# Patient Record
Sex: MALE
[De-identification: names, ages, dates, MRNs, and addresses within clinical notes are randomized per-mention and may not be internally consistent; named-entity substitution may affect disease eponyms.]

---

## 2018-03-27 ENCOUNTER — HOSPITAL ENCOUNTER (EMERGENCY)
Dept: HOSPITAL 97 - ER | Age: 38
Discharge: HOME | End: 2018-03-27

## 2018-03-27 PROCEDURE — 99283 EMERGENCY DEPT VISIT LOW MDM: CPT

## 2018-03-27 PROCEDURE — 93005 ELECTROCARDIOGRAM TRACING: CPT

## 2018-03-27 NOTE — EKG
Test Date:    2018-03-27               Test Time:    15:59:11

Technician:   ALIZE                                     

                                                     

MEASUREMENT RESULTS:                                       

Intervals:                                           

Rate:         86                                     

OR:           144                                    

QRSD:         80                                     

QT:           346                                    

QTc:          414                                    

Axis:                                                

P:            62                                     

OR:           144                                    

QRS:          61                                     

T:            39                                     

                                                     

INTERPRETIVE STATEMENTS:                                       

                                                     

Normal sinus rhythm

Normal ECG

No previous ECG available for comparison



Electronically Signed On 03-27-18 16:24:09 CDT by Wil Abraham

## 2018-03-27 NOTE — EDPHYS
Physician Documentation                                                                           

 [facility]                                                                

Name: Maulik Jones                                                                              

Age: 37 yrs                                                                                       

Sex: Male                                                                                         

: 1980                                                                                   

MRN: [id number]                                                                                   

Arrival Date: 2018                                                                          

Time: 14:28                                                                                       

Account#: [id number]                                                                            

Bed 18                                                                                            

Private MD:                                                                                       

ED Physician Jesús Ausitn                                                                         

HPI:                                                                                              

                                                                                             

15:29 This 37 yrs old  Male presents to ER via Ambulatory with complaints of Heat     cp  

      Exposure, Anxiety.                                                                          

15:31 The patient presents to the emergency department with anxiety. Onset: The               cp  

      symptoms/episode began/occurred today, while at work. Past psychiatric history: Prior       

      diagnosis: bipolar disorder, depression, Anxiety, PTSD, Psychiatric medications             

      include: unknown. Associated signs and symptoms: Pertinent positives; nausea,               

      dizziness, Pertinent negatives: abdominal pain, chest pain. Severity of symptoms: in        

      the emergency department the symptoms have improved moderately.                             

15:35 Patient reports he had to leave work early today after he started having nausea,        cp  

      general paresthesias, body cramping, dizziness.                                             

                                                                                                  

Historical:                                                                                       

- Allergies:                                                                                      

14:41 No Known Drug Allergies;                                                                lk1 

- PMHx:                                                                                           

14:41 Anxiety; Bipolar disorder; Depression; PTSD;                                            lk1 

- PSHx:                                                                                           

14:41 None;                                                                                   lk1 

                                                                                                  

- Immunization history:: Adult Immunizations up to date.                                          

- Social history:: Smoking status: Patient uses tobacco products, smokes one-half pack            

  cigarettes per day.                                                                             

                                                                                                  

                                                                                                  

ROS:                                                                                              

15:35 Eyes: Negative for injury, pain, redness, and discharge.                                cp  

15:35 Constitutional: Negative for body aches, chills, fever, poor PO intake.                     

15:35 ENT: Negative for drainage from ear(s), ear pain, sore throat, difficulty swallowing,       

      difficulty handling secretions.                                                             

15:35 Cardiovascular: Negative for chest pain, edema, palpitations.                               

15:35 Respiratory: Negative for cough, shortness of breath, wheezing.                             

15:35 Abdomen/GI: Positive for nausea, Negative for abdominal pain, vomiting, diarrhea,           

      constipation, black/tarry stool, rectal bleeding.                                           

15:35 Skin: Negative for cellulitis, rash.                                                        

15:35 Neuro: Positive for dizziness, lightheaded, Negative for altered mental status,             

      headache, syncope, near syncope, weakness.                                                  

15:35 Psych: Positive for anxiety, Negative for auditory hallucinations, visual                   

      hallucinations, suicide gesture, suicidal ideation.                                         

15:35 All other systems are negative.                                                             

                                                                                                  

Exam:                                                                                             

15:42 Constitutional: The patient appears in no acute distress, alert, awake,                 cp  

      non-diaphoretic, non-toxic, well developed, well nourished.                                 

15:42 Head/Face:  Normocephalic, atraumatic. Eyes:  Pupils equal round and reactive to light, cp  

      extra-ocular motions intact.  Lids and lashes normal.  Conjunctiva and sclera are           

      non-icteric and not injected.  Cornea within normal limits.  Periorbital areas with no      

      swelling, redness, or edema. ENT:  Nares patent. No nasal discharge, no septal              

      abnormalities noted.  Tympanic membranes are normal and external auditory canals are        

      clear.  Oropharynx with no redness, swelling, or masses, exudates, or evidence of           

      obstruction, uvula midline.  Mucous membranes moist. Chest/axilla:  Normal chest wall       

      appearance and motion.  Nontender with no deformity.  No lesions are appreciated.           

15:42 Cardiovascular: Rate: tachycardic, Rhythm: regular, Pulses: Pulses are 2+ in right          

      radial artery and left radial artery. Edema: is not appreciated, JVD: is not                

      appreciated.                                                                                

15:42 Respiratory: the patient does not display signs of respiratory distress,  Respirations:     

      normal, no use of accessory muscles, no retractions, no splinting, no tachypnea,            

      labored breathing, is not present, Breath sounds: are clear throughout, no decreased        

      breath sounds, no stridor, no wheezing.                                                     

15:42 Abdomen/GI: Inspection: abdomen appears normal, Bowel sounds: active, all quadrants,        

      Palpation: abdomen is soft and non-tender, in all quadrants, rebound tenderness, is not     

      appreciated, voluntary guarding, is not appreciated, involuntary guarding, is not           

      appreciated.                                                                                

15:42 Back: pain, is absent, ROM is normal.                                                       

15:42 Skin: cellulitis, is not appreciated, no rash present.                                      

15:42 Neuro: Orientation: to person, place \T\ time. Mentation: is normal, Cerebellar function:   

      is grossly normal, Motor: moves all fours, strength is normal, Sensation: no obvious        

      gross deficits.                                                                             

16:05 ECG was reviewed by the Attending Physician.                                            cp  

                                                                                                  

Vital Signs:                                                                                      

14:42  / 76; Pulse 101; Resp 20; Temp 97.0(TE); Pulse Ox 97% on R/A; Weight 86.18 kg    lk1 

      (R); Height 5 ft. 8 in. (172.72 cm); Pain 10/10;                                            

16:25  / 71; Pulse 89; Resp 18; Temp 97; Pulse Ox 99% ; Pain 0/10;                      rs2 

14:42 Body Mass Index 28.89 (86.18 kg, 172.72 cm)                                             lk1 

                                                                                                  

MDM:                                                                                              

14:47 Patient medically screened.                                                             cp  

15:30 Differential diagnosis: drug withdrawal. acute psychotic break, psychosis secondary to  cp  

      non-compliance, cardiac arrythmia.                                                          

16:05 Data reviewed: vital signs, nurses notes, EKG, and as a result, I will discharge        cp  

      patient.                                                                                    

16:05 Test interpretation: by ED physician or midlevel provider: ECG.                         cp  

16:05 Counseling: I had a detailed discussion with the patient and/or guardian regarding: the cp  

      historical points, exam findings, and any diagnostic results supporting the                 

      discharge/admit diagnosis, to return to the emergency department if symptoms worsen or      

      persist or if there are any questions or concerns that arise at home.                       

                                                                                                  

                                                                                             

15:38 Order name: EKG; Complete Time: 15:39                                                     

                                                                                             

15:38 Order name: EKG - Nurse/Tech; Complete Time: 16:11                                      cp  

                                                                                                  

EC:05 Rate is 86 beats/min. Rhythm is regular. CO interval is normal. QRS interval is normal. cp  

      QT interval is normal. No ST changes noted. Interpreted by me. Reviewed by me.              

                                                                                                  

Administered Medications:                                                                         

15:31 Drug: Zofran 4 mg Route: PO;                                                            rs2 

16:11 Follow up: Response: No adverse reaction; Marked relief of symptoms                     rs2 

16:24 Follow up: Response: No adverse reaction; Marked relief of symptoms                     rs2 

                                                                                                  

                                                                                                  

Disposition:                                                                                      

16:32 Co-signature as Attending Physician, Jesús Austin MD.                                    rn  

                                                                                                  

Disposition:                                                                                      

18 16:08 Discharged to Home. Impression: Nausea.                                            

- Condition is Stable.                                                                            

- Discharge Instructions: Panic Attacks, Nausea, Adult.                                           

- Prescriptions for Zofran 4 mg Oral Tablet - take 1 tablet by ORAL route every 12                

  hours As needed; 20 tablet.                                                                     

- Medication Reconciliation Form, Thank You Letter, Antibiotic Education, Prescription            

  Opioid Use, Work release form form.                                                             

- Follow up: Private Physician; When: 1 - 2 days; Reason: Recheck today's complaints.             

- Problem is new.                                                                                 

- Symptoms have improved.                                                                         

                                                                                                  

                                                                                                  

                                                                                                  

Signatures:                                                                                       

Jesús Austin MD MD   rn                                                   

Nahum Luna PA PA cp Kluge Judy, RN                         RN   lk1                                                  

Chuck, Any                                 rs2                                                  

                                                                                                  

**************************************************************************************************

## 2018-03-27 NOTE — ER
Nurse's Notes                                                                                     

 [facility]                                                                

Name: Maulik Jones                                                                              

Age: 37 yrs                                                                                       

Sex: Male                                                                                         

: 1980                                                                                   

MRN: [id number]                                                                                   

Arrival Date: 2018                                                                          

Time: 14:28                                                                                       

Account#: [id number]                                                                            

Bed 18                                                                                            

Private MD:                                                                                       

Diagnosis: Nausea                                                                                 

                                                                                                  

Presentation:                                                                                     

                                                                                             

14:38 Presenting complaint: Patient states: "I overheated and got dehydrated. I have bipolar  lk1 

      disorder and PTSD and anxiety. I felt like I was going to die and my whole body got         

      numb and I was scared. My hand was cramped and I got light headed. I feel better, but       

      my hands are still tingling." Denies suicidal ideations at this time. Transition of         

      care: patient was not received from another setting of care. Onset of symptoms was          

      2018 at 13:30. Care prior to arrival: None.                                       

14:38 Method Of Arrival: Ambulatory                                                           lk1 

14:38 Acuity: VINCENT 3                                                                           lk1 

                                                                                                  

Triage Assessment:                                                                                

14:42 General: Appears in no apparent distress. Behavior is calm, cooperative, appropriate    lk1 

      for age. Pain: Complains of pain in right hand and left hand Pain currently is 10 out       

      of 10 on a pain scale. Derm: Skin is pink, warm \T\ dry.                                    

                                                                                                  

Historical:                                                                                       

- Allergies:                                                                                      

14:41 No Known Drug Allergies;                                                                lk1 

- PMHx:                                                                                           

14:41 Anxiety; Bipolar disorder; Depression; PTSD;                                            lk1 

- PSHx:                                                                                           

14:41 None;                                                                                   lk1 

                                                                                                  

- Immunization history:: Adult Immunizations up to date.                                          

- Social history:: Smoking status: Patient uses tobacco products, smokes one-half pack            

  cigarettes per day.                                                                             

                                                                                                  

                                                                                                  

Screenin:55 Abuse screen: Denies threats or abuse. Nutritional screening: No deficits noted.        rs2 

      Tuberculosis screening: No symptoms or risk factors identified. Fall Risk None              

      identified.                                                                                 

                                                                                                  

Assessment:                                                                                       

14:55 General: Appears distressed, well groomed, well developed, Behavior is cooperative,     rs2 

      anxious, restless, Reports Pt states, "I was at work and felt a little overheated and       

      started having one of my panic attacks and my hands started cramping. I'm proud of my       

      self though, usually I get suicidal or homicidal when I think about my son getting          

      murdered, but today I wasn't thinking about that and I've managed to calm myself back       

      down. Now I just have a headache.". Pain: Complains of pain in face Pain currently is 5     

      out of 10 on a pain scale. Neuro: No deficits noted. Cardiovascular: No deficits noted.     

      Respiratory: No deficits noted. GI: No deficits noted. : No deficits noted. EENT: No      

      deficits noted. Musculoskeletal: No deficits noted.                                         

                                                                                                  

Vital Signs:                                                                                      

14:42  / 76; Pulse 101; Resp 20; Temp 97.0(TE); Pulse Ox 97% on R/A; Weight 86.18 kg    lk1 

      (R); Height 5 ft. 8 in. (172.72 cm); Pain 10/10;                                            

16:25  / 71; Pulse 89; Resp 18; Temp 97; Pulse Ox 99% ; Pain 0/10;                      rs2 

14:42 Body Mass Index 28.89 (86.18 kg, 172.72 cm)                                             lk1 

                                                                                                  

ED Course:                                                                                        

14:28 Patient arrived in ED.                                                                  as  

14:41 Triage completed.                                                                       lk1 

14:44 Arm band placed on left wrist.                                                          lk1 

14:46 [facility]                                                                [facility]  

14:46 Jesús Austin MD is Attending Physician.                                                cp  

14:54 Any Woods is Primary Nurse.                                                          rs2 

14:55 Patient has correct armband on for positive identification. Bed in low position. Call   rs2 

      light in reach.                                                                             

16:03 EKG done, by EKG tech. reviewed by Nahum BOOKER.                                       at1 

16:25 No provider procedures requiring assistance completed. Patient did not have IV access   rs2 

      during this emergency room visit.                                                           

                                                                                                  

Administered Medications:                                                                         

15:31 Drug: Zofran 4 mg Route: PO;                                                            rs2 

16:11 Follow up: Response: No adverse reaction; Marked relief of symptoms                     rs2 

16:24 Follow up: Response: No adverse reaction; Marked relief of symptoms                     rs2 

                                                                                                  

                                                                                                  

Outcome:                                                                                          

16:08 Discharge ordered by MD.                                                                cp  

16:25 Discharged to home with family.                                                         rs2 

16:25 Condition: improved                                                                         

16:25 Discharge instructions given to patient, Instructed on discharge instructions, follow       

      up and referral plans. Demonstrated understanding of Prescriptions given X 1.               

16:27 Patient left the ED.                                                                    rs2 

                                                                                                  

Signatures:                                                                                       

Leatha Alexander Amanda, EKG Tech              EKG Tat1                                                  

Nahum Luna PA                         PA   Judy Deluca RN                         RN   lk1                                                  

Chuck, Any                                 rs2                                                  

                                                                                                  

**************************************************************************************************

## 2018-04-25 ENCOUNTER — HOSPITAL ENCOUNTER (EMERGENCY)
Dept: HOSPITAL 97 - ER | Age: 38
Discharge: LEFT BEFORE DISCHARGE | End: 2018-04-25
Payer: SELF-PAY

## 2018-04-25 DIAGNOSIS — F43.10: ICD-10-CM

## 2018-04-25 DIAGNOSIS — F17.210: ICD-10-CM

## 2018-04-25 DIAGNOSIS — F32.9: ICD-10-CM

## 2018-04-25 DIAGNOSIS — F41.9: Primary | ICD-10-CM

## 2018-04-25 LAB
ALBUMIN SERPL BCP-MCNC: 6 G/DL (ref 3.2–5.5)
ALP SERPL-CCNC: 74 IU/L (ref 42–121)
ALT SERPL W P-5'-P-CCNC: 25 IU/L (ref 10–60)
AMYLASE SERPL-CCNC: 40 U/L (ref 28–100)
AST SERPL W P-5'-P-CCNC: 37 IU/L (ref 10–42)
BUN BLD-MCNC: 19 MG/DL (ref 6–20)
CKMB CREATINE KINASE MB: 3.2 NG/ML (ref 0.3–4)
GLUCOSE SERPLBLD-MCNC: 136 MG/DL (ref 65–120)
HCT VFR BLD CALC: 50.4 % (ref 39.6–49)
LIPASE SERPL-CCNC: 14 U/L (ref 22–51)
LYMPHOCYTES # SPEC AUTO: 1.1 K/UL (ref 0.7–4.9)
MAGNESIUM SERPL-MCNC: 1.9 MG/DL (ref 1.8–2.5)
MCH RBC QN AUTO: 29.9 PG (ref 27–35)
MCV RBC: 89 FL (ref 80–100)
PMV BLD: 7.7 FL (ref 7.6–11.3)
POTASSIUM SERPL-SCNC: 3.1 MEQ/L (ref 3.6–5)
RBC # BLD: 5.67 M/UL (ref 4.33–5.43)

## 2018-04-25 PROCEDURE — 84484 ASSAY OF TROPONIN QUANT: CPT

## 2018-04-25 PROCEDURE — 83735 ASSAY OF MAGNESIUM: CPT

## 2018-04-25 PROCEDURE — 80048 BASIC METABOLIC PNL TOTAL CA: CPT

## 2018-04-25 PROCEDURE — 82550 ASSAY OF CK (CPK): CPT

## 2018-04-25 PROCEDURE — 82150 ASSAY OF AMYLASE: CPT

## 2018-04-25 PROCEDURE — 96365 THER/PROPH/DIAG IV INF INIT: CPT

## 2018-04-25 PROCEDURE — 36415 COLL VENOUS BLD VENIPUNCTURE: CPT

## 2018-04-25 PROCEDURE — 83690 ASSAY OF LIPASE: CPT

## 2018-04-25 PROCEDURE — 93005 ELECTROCARDIOGRAM TRACING: CPT

## 2018-04-25 PROCEDURE — 99285 EMERGENCY DEPT VISIT HI MDM: CPT

## 2018-04-25 PROCEDURE — 96375 TX/PRO/DX INJ NEW DRUG ADDON: CPT

## 2018-04-25 PROCEDURE — 96372 THER/PROPH/DIAG INJ SC/IM: CPT

## 2018-04-25 PROCEDURE — 85025 COMPLETE CBC W/AUTO DIFF WBC: CPT

## 2018-04-25 PROCEDURE — 80076 HEPATIC FUNCTION PANEL: CPT

## 2018-04-25 PROCEDURE — 74176 CT ABD & PELVIS W/O CONTRAST: CPT

## 2018-04-25 PROCEDURE — 71250 CT THORAX DX C-: CPT

## 2018-04-25 PROCEDURE — 96366 THER/PROPH/DIAG IV INF ADDON: CPT

## 2018-04-25 PROCEDURE — 82553 CREATINE MB FRACTION: CPT

## 2018-04-25 PROCEDURE — 96361 HYDRATE IV INFUSION ADD-ON: CPT

## 2018-04-25 NOTE — RAD REPORT
EXAM DESCRIPTION:  CT - Chest Abd Pelvis Wo Con - 4/25/2018 6:03 pm

 

CLINICAL HISTORY:   Chest and abdominal pain. Right lower quadrant pain.

 

COMPARISON:  None

 

TECHNIQUE:  Computed axial tomography of the chest, abdomen and pelvis was obtained. Oral contrast wa
s given. IV contrast was not requested.

 

All CT scans are performed using dose optimization technique as appropriate and may include automated
 exposure control or mA/KV adjustment according to patient size.

 

FINDINGS:   The evaluation of mediastinum, good, vessels and solid organs is limited secondary to the
 lack of IV contrast administration

 

 No mediastinal or hilar lymphadenopathy is seen.

 

A pleural effusion is not present. A pericardial effusion is not seen.

 

The lungs  essentially clear.

 

The liver, spleen, pancreas, adrenals and kidneys appear grossly normal

 

There is no evidence of diverticulitis. The appendix is normal

 

IMPRESSION:   Unremarkable unenhanced CT chest, abdomen and pelvis

## 2018-04-25 NOTE — ER
Nurse's Notes                                                                                     

 [facility]                                                                

Name: Maulik Jones                                                                              

Age: 37 yrs                                                                                       

Sex: Male                                                                                         

: 1980                                                                                   

MRN: [id number]                                                                                   

Arrival Date: 2018                                                                          

Time: 15:43                                                                                       

Account#: [id number]                                                                            

Bed 7                                                                                             

Private MD:                                                                                       

Diagnosis:                                                                                        

                                                                                                  

Presentation:                                                                                     

                                                                                             

15:47 Presenting complaint: Patient states: "anxiety attack that has been going on all day. " aj  

      Patient arrived hyperventilating with reported tingling to bilateral fingers. Placed on     

      NRB mask. Symptoms resolved after mask application. Transition of care: patient was not     

      received from another setting of care. Onset of symptoms was 2018. Initial        

      Sepsis Screen: Does the patient meet any 2 criteria? No. Patient's initial sepsis           

      screen is negative. Does the patient have a suspected source of infection? No.              

      Patient's initial sepsis screen is negative. Care prior to arrival: None.                   

15:47 Method Of Arrival: Wheelchair                                                             

15:47 Acuity: VINCENT 3                                                                           hb  

                                                                                                  

Triage Assessment:                                                                                

15:50 General: Appears in no apparent distress. comfortable, Behavior is cooperative,         aj  

      anxious. Pain: Denies pain. Neuro: Level of Consciousness is awake, alert, obeys            

      commands, Oriented to person, place, time, situation, Appropriate for age. Respiratory:     

      Airway is patent Respiratory effort is even, unlabored, Respiratory pattern is regular,     

      symmetrical. Derm: Skin is intact, is healthy with good turgor, Skin is pink, warm \T\      

      dry. normal.                                                                                

                                                                                                  

Historical:                                                                                       

- Allergies:                                                                                      

15:50 No Known Allergies;                                                                     aj  

- Home Meds:                                                                                      

15:50 None [Active];                                                                          aj  

- PMHx:                                                                                           

15:50 Anxiety; Bipolar disorder; Depression; PTSD;                                            aj  

- PSHx:                                                                                           

15:50 None;                                                                                   aj  

                                                                                                  

- Immunization history:: Adult Immunizations up to date.                                          

- Social history:: Smoking status: Patient uses tobacco products, smokes one-half pack            

  cigarettes per day, Patient uses street drugs, cocaine, Methamphetamine (Meth).                 

                                                                                                  

                                                                                                  

Screenin:21 Abuse screen: Denies threats or abuse. Denies injuries from another. Nutritional        sv  

      screening: No deficits noted. Tuberculosis screening: No symptoms or risk factors           

      identified. Fall Risk None identified.                                                      

                                                                                                  

Assessment:                                                                                       

16:18 General: Appears uncomfortable, Behavior is cooperative, anxious. Neuro: Level of       sv  

      Consciousness is awake, alert, obeys commands, Oriented to person, place, time,             

      situation, Moves all extremities. Full function Gait is steady, Speech is normal.           

      Respiratory: Respiratory effort is even, unlabored, Respiratory pattern is regular,         

      symmetrical. GI: Pt is actively vomiting clear fluid, Informed Nahum PA Reports nausea,     

      usually happens after he has an anxiety attack. Derm: Skin is normal. Musculoskeletal:      

      Range of motion: intact in all extremities.                                                 

16:18 Cardiovascular: Reports chest pain, nausea, vomiting.                                   sv  

17:38 Reassessment: Patient appears in no apparent distress at this time. Patient and/or      sv  

      family updated on plan of care and expected duration. Pain level reassessed. Patient is     

      alert, oriented x 3, equal unlabored respirations, skin warm/dry/pink.                      

18:48 Reassessment: Patient appears in no apparent distress at this time. Patient and/or      sv  

      family updated on plan of care and expected duration. Pain level reassessed. Patient is     

      alert, oriented x 3, equal unlabored respirations, skin warm/dry/pink. Patient states       

      feeling better. Patient states symptoms have improved.                                      

19:14 Reassessment: Patient appears in no apparent distress at this time. Patient and/or      ak1 

      family updated on plan of care and expected duration. Pain level reassessed. Patient is     

      alert, oriented x 3, equal unlabored respirations, skin warm/dry/pink. pt informed of       

      admission status and wait for room assignment. Patient denies pain at this time.            

      Patient states feeling better. Patient states symptoms have improved.                       

20:36 Reassessment: Patient appears in no apparent distress at this time. Patient and/or      ao  

      family updated on plan of care and expected duration. Pain level reassessed. Patient is     

      alert, oriented x 3, equal unlabored respirations, skin warm/dry/pink. Waiting on a         

      hospital room assigment.                                                                    

21:29 Reassessment: Called report to SELWYN Gonzalez. Patient to be taking to his room. Patient ao  

      appears under no distress at this moment. VS stable.                                        

21:46 Reassessment: Patient called to and stated he is leaving. Patient IV was pulled out.    ao  

      Patient sing the AMA form and agree to come back is any chest pain or SOB occurs.           

      Educations was provided but patient still left AMA. Dr Gonzales was notified.                   

                                                                                                  

Vital Signs:                                                                                      

15:50  / 83; Pulse 117; Resp 40; Temp 98.2; Pulse Ox 100% on R/A; Weight 79.38 kg;      aj  

      Height 5 ft. 8 in. (172.72 cm); Pain 0/10;                                                  

15:51 Resp 18; Pulse Ox 100% on 0% Non-rebreather mask;                                       aj  

17:39  / 95; Pulse 99 MON; Resp 18; Pulse Ox 100% ;                                     sv  

18:48  / 89; Pulse 99 MON; Resp 13; Pulse Ox 100% on R/A; Pain 4/10;                    sv  

19:20  / 84; Pulse 98; Resp 14; Pulse Ox 99% on R/A; Pain 0/10;                         ao  

20:36  / 86; Pulse 105; Resp 16; Pulse Ox 100% on R/A; Pain 0/10;                       ao  

21:29  / 84; Pulse 115; Resp 20; Pulse Ox 100% on R/A; Pain 0/10;                       ao  

15:50 Body Mass Index 26.61 (79.38 kg, 172.72 cm)                                             aj  

17:39 Sinus Rhythm                                                                            sv  

18:48 Sinus Rhythm                                                                            sv  

                                                                                                  

ED Course:                                                                                        

15:43 Patient arrived in ED.                                                                  as  

15:49 Triage completed.                                                                       aj  

15:51 Arm band placed on right wrist. Patient placed in waiting room, in a wheelchair,        aj  

      Patient notified of wait time.                                                              

16:09 Claribel Dumas, RN is Primary Nurse.                                                  sv  

16:09 Nahum Luna PA is PHCP.                                                                cp  

16:09 Jesús Austin MD is Attending Physician.                                                cp  

16:21 Patient has correct armband on for positive identification. Bed in low position. Call   sv  

      light in reach. Adult w/ patient. Door closed. Head of bed elevated.                        

16:39 EKG done, by EKG tech. reviewed by Nahum BOOKER.                                       at1 

16:47 Initial lab(s) drawn, by ED staff, sent to lab. Inserted saline lock: 20 gauge in right sv  

      antecubital area, using aseptic technique. Blood collected.                                 

17:25 Cardiac monitor on. Pulse ox on. NIBP on.                                               sv  

17:38 EKG done, by ED staff, reviewed by Nahum BOOKER.                                       sv  

18:02 CT Chest Abdomen Pelvis W/O Contrast In Process Unspecified.                            EDMS

18:05 CT completed. Patient tolerated procedure well. Patient moved to CT via stretcher.      vr  

      Patient moved back from CT.                                                                 

19:06 Primary Nurse role handed off by Claribel Dumas, RN                                   sv  

19:13 Iwona Bingham, RN is Primary Nurse.                                                     ak1 

19:30 Ketty Gonzales MD is Hospitalizing Provider.                                           cp  

21:28 No provider procedures requiring assistance completed. Patient admitted, IV remains in  ao  

      place.                                                                                      

                                                                                                  

Administered Medications:                                                                         

      Discontinued: NS 0.9% 1000 ml IV at 125 ml/hr continuous                                    

16:46 Drug: Benadryl 25 mg Route: IVP; Site: right antecubital;                               sv  

17:38 Follow up: Response: No adverse reaction                                                hb  

16:46 Drug: NS 0.9% 1000 ml Route: IV; Rate: 1 bolus; Site: right antecubital;                sv  

17:50 Follow up: Response: No adverse reaction; IV Status: Completed infusion; IV Intake:     sv  

      1000ml                                                                                      

16:47 Drug: Zofran 4 mg Route: IVP; Site: right antecubital;                                  sv  

17:37 Follow up: Response: No adverse reaction                                                hb  

17:43 Drug: Aspirin Chewable Tablet 324 mg Route: PO;                                         hb  

18:27 Follow up: Response: No adverse reaction                                                sv  

17:44 Drug: morphine 4 mg Route: IVP; Site: right antecubital;                                hb  

18:26 Follow up: Response: No adverse reaction                                                sv  

17:44 Drug: Zofran 4 mg Route: IVP; Site: right antecubital;                                  hb  

18:26 Follow up: Response: No adverse reaction                                                sv  

17:44 Drug: ProTONIX 40 mg Route: IVP; Site: right antecubital;                               hb  

18:26 Follow up: Response: No adverse reaction                                                sv  

18:47 Drug: NS 0.9% 1000 ml Route: IV; Rate: 1 bolus; Site: right antecubital;                sv  

20:00 Follow up: IV Status: Completed infusion; IV Intake: 1000ml                             ao  

18:47 Drug: Potassium Chloride 20 mEq Route: IV; Rate: calculated rate; Site: right           sv  

      antecubital;                                                                                

21:00 Follow up: IV Status: Completed infusion; IV Intake: 100ml                              ao  

19:54 Drug: Lovenox 1 mg/kg Route: Sub-Q; Site: abdomen;                                      ao  

23:07 Follow up: Response: No adverse reaction                                                ao  

19:54 Drug: NS 0.9% 1000 ml Route: IV; Rate: 125 ml/hr; Site: right antecubital;              ao  

22:00 Follow up: IV Status: Infusion continued upon admission                                 ao  

                                                                                                  

                                                                                                  

Intake:                                                                                           

17:50 IV: 1000ml; Total: 1000ml.                                                              sv  

                                                                                                  

Outcome:                                                                                          

19:32 Decision to Hospitalize by Provider.                                                    cp  

21:28 AMA AMA form signed                                                                     ao  

21:28 Condition: stable                                                                           

21:28 Instructed on the need for admit.                                                           

21:45 Instructed on Patient left AMA and was told to comeback is any chest pain or SOB occurs ao  

21:48 Patient left the ED.                                                                    ao  

                                                                                                  

Signatures:                                                                                       

Dispatcher [employer]                           Claribel Lane RN RN sv Myers, Amanda, RN RN aj Martinez, Amelia as Davis, Victoria vr gonzales, Amanda, EKG Tech              EKG Tat1                                                  

Iwona Bingham RN                       RN   ak1                                                  

Nahum Luna PA PA cp Ortiz, Alex RN                         Karen Pickard RN                     RN   hb                                                   

                                                                                                  

Corrections: (The following items were deleted from the chart)                                    

16:49 15:47 Acuity: VINCENT 4 aj                                                                  hb  

17:39 16:18 Respiratory: Respiratory effort is even, unlabored, Respiratory pattern is        sv  

      regular, symmetrical, sv                                                                    

18:49 18:48  / 89; Pulse 99bpm; Monitor: Sinus RhythmResp 13bpm; Pulse Ox 100% RA; sv   sv  

21:46 21:28 Admitted to Tele accompanied by tech, room 421, with chart, Report called to      SELWYN Sales                                                                            

                                                                                                  

**************************************************************************************************

## 2018-04-25 NOTE — EKG
Test Date:    2018-04-25               Test Time:    16:33:12

Technician:   ALIZE                                     

                                                     

MEASUREMENT RESULTS:                                       

Intervals:                                           

Rate:         91                                     

DE:           136                                    

QRSD:         82                                     

QT:           362                                    

QTc:          445                                    

Axis:                                                

P:            78                                     

DE:           136                                    

QRS:          82                                     

T:            52                                     

                                                     

INTERPRETIVE STATEMENTS:                                       

                                                     

Normal sinus rhythm

Right atrial enlargement

ST elevation, probably due to early repolarization

Borderline ECG

Compared to ECG 03/27/2018 15:59:11

Atrial abnormality now present

ST (T wave) deviation now present

Early repolarization now present



Electronically Signed On 04-25-18 18:29:30 CDT by Marciano George

## 2018-04-25 NOTE — EDPHYS
Physician Documentation                                                                           

 [facility]                                                                

Name: Maulik Jones                                                                              

Age: 37 yrs                                                                                       

Sex: Male                                                                                         

: 1980                                                                                   

MRN: [id number]                                                                                   

Arrival Date: 2018                                                                          

Time: 15:43                                                                                       

Account#: [id number]                                                                            

Bed 7                                                                                             

Private MD:                                                                                       

ED Physician Jesús Austin                                                                         

HPI:                                                                                              

                                                                                             

16:15 This 37 yrs old  Male presents to ER via Wheelchair with complaints of Anxiety. cp  

16:15 The patient presents to the emergency department with anxiety.                          cp  

16:15 Onset: The symptoms/episode began/occurred today, and became worse. Past psychiatric    cp  

      history: Prior diagnosis: bipolar disorder, depression, Psychiatric medications             

      include: none. Associated signs and symptoms: Pertinent positives; abdominal pain,          

      chest pain, nausea, vomiting, Pertinent negatives: shortness of breath. Severity of         

      symptoms: in the emergency department the symptoms are unchanged.                           

17:30 Patient admits to use of methamphetamine and cocaine approximately 1 week ago.          cp  

                                                                                                  

Historical:                                                                                       

- Allergies:                                                                                      

15:50 No Known Allergies;                                                                     aj  

- Home Meds:                                                                                      

15:50 None [Active];                                                                          aj  

- PMHx:                                                                                           

15:50 Anxiety; Bipolar disorder; Depression; PTSD;                                            aj  

- PSHx:                                                                                           

15:50 None;                                                                                   aj  

                                                                                                  

- Immunization history:: Adult Immunizations up to date.                                          

- Social history:: Smoking status: Patient uses tobacco products, smokes one-half pack            

  cigarettes per day, Patient uses street drugs, cocaine, Methamphetamine (Meth).                 

                                                                                                  

                                                                                                  

ROS:                                                                                              

16:30 Constitutional: Negative for body aches, chills, fever, poor PO intake.                 cp  

16:30 Eyes: Negative for injury, pain, redness, and discharge.                                cp  

16:30 ENT: Negative for drainage from ear(s), ear pain, sore throat, difficulty swallowing,       

      difficulty handling secretions.                                                             

16:30 Neck: Negative for pain with movement, pain at rest, stiffness, swollen nodes.              

16:30 Cardiovascular: Positive for chest pain, Negative for edema, palpitations.                  

16:30 Respiratory: Negative for cough, shortness of breath, wheezing.                             

16:30 Abdomen/GI: Positive for abdominal pain, nausea, vomiting, Negative for diarrhea,           

      constipation, black/tarry stool, rectal bleeding.                                           

16:30 Back: Negative for pain at rest, pain with movement, radiated pain.                         

16:30 : Negative for urinary symptoms.                                                          

16:30 Skin: Negative for cellulitis, rash.                                                        

16:30 Neuro: Negative for altered mental status, headache, seizure activity, weakness.            

16:30 Psych: Positive for anxiety.                                                                

16:30 All other systems are negative.                                                             

                                                                                                  

Exam:                                                                                             

16:35 Constitutional: The patient appears in no acute distress, alert, awake,                 cp  

      non-diaphoretic, non-toxic, well developed, well nourished.                                 

16:35 Head/Face:  Normocephalic, atraumatic.                                                  cp  

16:35 Eyes: Periorbital structures: appear normal, Pupils: equal, round, and reactive to          

      light and accomodation, Extraocular movements: intact throughout, Conjunctiva: normal,      

      no exudate, no injection, Sclera: no appreciated abnormality, Lids and lashes: appear       

      normal, bilaterally.                                                                        

16:35 ENT: External ear(s): are unremarkable, Ear canal(s): are normal, clear, TM's: bulging,     

      is not appreciated, bilaterally, dullness, bilaterally, erythema, is not appreciated,       

      bilaterally, Nose: is normal, Mouth: Lips: moist, Oral mucosa: pink and intact, moist,      

      Posterior pharynx: Airway: no evidence of obstruction, patent, Tonsils: are normal in       

      appearance, swelling, is not appreciated, erythema, is not appreciated, exudate, is not     

      appreciated, Voice: is normal.                                                              

16:35 Neck: ROM/movement: is normal, is supple, without pain, no range of motions                 

      limitations, no meningismus, no nuchal rigidity.                                            

16:35 Chest/axilla: Inspection: normal, Palpation: is normal, no crepitus, no tenderness.         

16:35 Cardiovascular: Rate: tachycardic, Rhythm: regular, Pulses: Pulses are 2+ in right          

      radial artery and left radial artery. Edema: is not appreciated, JVD: is not                

      appreciated.                                                                                

16:35 Respiratory: the patient does not display signs of respiratory distress,  Respirations:     

      normal, no use of accessory muscles, no retractions, no splinting, no tachypnea,            

      labored breathing, is not present, Breath sounds: are clear throughout, no decreased        

      breath sounds, no stridor, no wheezing.                                                     

16:35 Abdomen/GI: Inspection: abdomen appears normal, Bowel sounds: active, all quadrants,        

      Palpation: soft, in all quadrants, moderate abdominal tenderness, in the right lower        

      quadrant, rebound tenderness, is not appreciated, voluntary guarding, is elicited in        

      the right lower quadrant, involuntary guarding, is not appreciated.                         

16:35 Back: pain, is absent, ROM is normal.                                                       

16:35 Skin: cellulitis, is not appreciated, no rash present.                                      

16:35 Neuro: Orientation: to person, place \T\ time. Mentation: lucid, able to follow commands,   

      Cerebellar function: is grossly normal, Motor: moves all fours, strength is normal,         

      Sensation: no obvious gross deficits.                                                       

16:40 ECG was reviewed by the Attending Physician.                                            cp  

                                                                                                  

Vital Signs:                                                                                      

15:50  / 83; Pulse 117; Resp 40; Temp 98.2; Pulse Ox 100% on R/A; Weight 79.38 kg;      aj  

      Height 5 ft. 8 in. (172.72 cm); Pain 0/10;                                                  

15:51 Resp 18; Pulse Ox 100% on 0% Non-rebreather mask;                                       aj  

17:39  / 95; Pulse 99 MON; Resp 18; Pulse Ox 100% ;                                     sv  

18:48  / 89; Pulse 99 MON; Resp 13; Pulse Ox 100% on R/A; Pain 4/10;                    sv  

19:20  / 84; Pulse 98; Resp 14; Pulse Ox 99% on R/A; Pain 0/10;                         ao  

20:36  / 86; Pulse 105; Resp 16; Pulse Ox 100% on R/A; Pain 0/10;                       ao  

21:29  / 84; Pulse 115; Resp 20; Pulse Ox 100% on R/A; Pain 0/10;                       ao  

15:50 Body Mass Index 26.61 (79.38 kg, 172.72 cm)                                             aj  

17:39 Sinus Rhythm                                                                            sv  

18:48 Sinus Rhythm                                                                            sv  

                                                                                                  

MDM:                                                                                              

16:10 Patient medically screened.                                                             cp  

17:00 Differential diagnosis: drug withdrawal. acute psychotic break, acute MI, anxiety,      cp  

      acute renal failure.                                                                        

18:30 Data reviewed: vital signs, nurses notes, lab test result(s), EKG, radiologic studies,  cp  

      plain films.                                                                                

18:30 Test interpretation: by ED physician or midlevel provider: ECG, plain radiologic        cp  

      studies. Counseling: I had a detailed discussion with the patient and/or guardian           

      regarding: the historical points, exam findings, and any diagnostic results supporting      

      the discharge/admit diagnosis, lab results, radiology results, the need for further         

      work-up and treatment in the hospital.                                                      

19:28 Physician consultation: Ketty Gonzales MD was called at 19:30, was contacted at 19:30,     

      regarding admission, to the telemetry unit. patient's condition.                            

                                                                                                  

                                                                                             

16:21 Order name: Amylase, Serum; Complete Time: 17:30                                        cp  

                                                                                             

16:21 Order name: Basic Metabolic Panel; Complete Time: 17:30                                 cp  

                                                                                             

17:47 Interpretation: Normal except: K 3.1; CL 98; CO2 20; GLUC 136; CRE 2.03; GFR 37.        cp  

                                                                                             

16:21 Order name: CBC with Diff; Complete Time: 17:30                                         cp  

                                                                                             

17:48 Interpretation: Normal except: WBC 11.4; RBC 5.67; HCT 50.4; DEEJAY% 80.7; LYM% 10.0; NEUT cp  

      A 9.2.                                                                                      

                                                                                             

16:21 Order name: Creatinine for Radiology; Complete Time: 17:30                              cp  

                                                                                             

16:21 Order name: Hepatic Function; Complete Time: 17:30                                      cp  

                                                                                             

16:21 Order name: Lipase; Complete Time: 17:30                                                cp  

                                                                                             

16:21 Order name: Urine Microscopic Only                                                      cp  

                                                                                             

16:21 Order name: UDS                                                                         cp  

                                                                                             

16:21 Order name: Magnesium; Complete Time: 17:30                                             cp  

                                                                                             

16:21 Order name: Troponin I; Complete Time: 17:30                                            cp  

                                                                                             

16:21 Order name: Ckmb; Complete Time: 17:30                                                  cp  

                                                                                             

16:21 Order name: CK; Complete Time: 17:30                                                      

                                                                                             

17:51 Order name: CT Chest Abdomen Pelvis W/O Contrast; Complete Time: 18:26                  cp  

                                                                                             

18:26 Interpretation: Report reviewed.                                                          

                                                                                             

16:11 Order name: EKG; Complete Time: 16:11                                                     

                                                                                             

16:11 Order name: EKG - Nurse/Tech; Complete Time: 16:47                                      cp  

                                                                                             

16:21 Order name: IV Saline Lock; Complete Time: 16:47                                        cp  

                                                                                             

16:21 Order name: Labs collected and sent; Complete Time: 16:47                               cp  

                                                                                                  

EC:40 Rate is 91 beats/min. Rhythm is regular. OH interval is normal. QRS interval is normal. cp  

      QT interval is normal. T waves are Normal. Interpreted by me. Reviewed by me.               

                                                                                                  

Administered Medications:                                                                         

      Discontinued: NS 0.9% 1000 ml IV at 125 ml/hr continuous                                    

16:46 Drug: Benadryl 25 mg Route: IVP; Site: right antecubital;                               sv  

17:38 Follow up: Response: No adverse reaction                                                hb  

16:46 Drug: NS 0.9% 1000 ml Route: IV; Rate: 1 bolus; Site: right antecubital;                sv  

17:50 Follow up: Response: No adverse reaction; IV Status: Completed infusion; IV Intake:     sv  

      1000ml                                                                                      

16:47 Drug: Zofran 4 mg Route: IVP; Site: right antecubital;                                  sv  

17:37 Follow up: Response: No adverse reaction                                                hb  

17:43 Drug: Aspirin Chewable Tablet 324 mg Route: PO;                                         hb  

18:27 Follow up: Response: No adverse reaction                                                sv  

17:44 Drug: morphine 4 mg Route: IVP; Site: right antecubital;                                hb  

18:26 Follow up: Response: No adverse reaction                                                sv  

17:44 Drug: Zofran 4 mg Route: IVP; Site: right antecubital;                                  hb  

18:26 Follow up: Response: No adverse reaction                                                sv  

17:44 Drug: ProTONIX 40 mg Route: IVP; Site: right antecubital;                               hb  

18:26 Follow up: Response: No adverse reaction                                                sv  

18:47 Drug: NS 0.9% 1000 ml Route: IV; Rate: 1 bolus; Site: right antecubital;                sv  

20:00 Follow up: IV Status: Completed infusion; IV Intake: 1000ml                             ao  

18:47 Drug: Potassium Chloride 20 mEq Route: IV; Rate: calculated rate; Site: right           sv  

      antecubital;                                                                                

21:00 Follow up: IV Status: Completed infusion; IV Intake: 100ml                              ao  

19:54 Drug: Lovenox 1 mg/kg Route: Sub-Q; Site: abdomen;                                      ao  

23:07 Follow up: Response: No adverse reaction                                                ao  

19:54 Drug: NS 0.9% 1000 ml Route: IV; Rate: 125 ml/hr; Site: right antecubital;              ao  

22:00 Follow up: IV Status: Infusion continued upon admission                                 ao  

                                                                                                  

                                                                                                  

Disposition:                                                                                      

17:35 Co-signature as Attending Physician, Jesús Austin MD Patient with mild chest pain,       rn  

      complains more of abd pain, admits to recent marijuana and cocaine use, ECG shows very      

      mild st elevation only in leads V2/V3, no reciprocal changes, not likely acute              

      occlusive event, will give morphine/aspirin, admit for rule out and cardiac consult. .      

                                                                                                  

Disposition:                                                                                      

18 21:48 Patient has left against medical advice. - Patients states they are going to       

  Home.                                                                                           

- Condition is Stable.                                                                            

                                                                                                  

                                                                                                  

                                                                                                  

                                                                                                  

                                                                                                  

                                                                                                  

                                                                                                  

Signatures:                                                                                       

Dispatcher [employer]                           Claribel Lane, Romina Ignacio RN, RN RN aj Nieto, Roman, MD MD rn Page, Corey, PA PA cp Ortiz, Alex, RN RN ao Baxter, Heather, RN                     RN   hb                                                   

                                                                                                  

**************************************************************************************************

## 2018-04-26 NOTE — EKG
Test Date:    2018-04-25               Test Time:    17:34:10

Technician:   DELMER                                    

                                                     

MEASUREMENT RESULTS:                                       

Intervals:                                           

Rate:         91                                     

OH:           134                                    

QRSD:         82                                     

QT:           358                                    

QTc:          440                                    

Axis:                                                

P:            3                                      

OH:           134                                    

QRS:          53                                     

T:            24                                     

                                                     

INTERPRETIVE STATEMENTS:                                       

                                                     

Normal sinus rhythm

Normal ECG

Compared to ECG 04/25/2018 16:33:12

Atrial abnormality no longer present

ST (T wave) deviation no longer present

Early repolarization no longer present



Electronically Signed On 04-26-18 15:38:05 CDT by Wil Abraham

## 2018-06-26 ENCOUNTER — HOSPITAL ENCOUNTER (EMERGENCY)
Dept: HOSPITAL 97 - ER | Age: 38
LOS: 3 days | Discharge: PSYCHIATRIC OR SUBSTANCE USE FACILITY | End: 2018-06-29
Payer: SELF-PAY

## 2018-06-26 DIAGNOSIS — F31.9: ICD-10-CM

## 2018-06-26 DIAGNOSIS — F32.9: ICD-10-CM

## 2018-06-26 DIAGNOSIS — F43.10: ICD-10-CM

## 2018-06-26 DIAGNOSIS — F55.8: Primary | ICD-10-CM

## 2018-06-26 DIAGNOSIS — T43.625A: ICD-10-CM

## 2018-06-26 PROCEDURE — 80320 DRUG SCREEN QUANTALCOHOLS: CPT

## 2018-06-26 PROCEDURE — 80329 ANALGESICS NON-OPIOID 1 OR 2: CPT

## 2018-06-26 PROCEDURE — 36415 COLL VENOUS BLD VENIPUNCTURE: CPT

## 2018-06-26 PROCEDURE — 96361 HYDRATE IV INFUSION ADD-ON: CPT

## 2018-06-26 PROCEDURE — 80307 DRUG TEST PRSMV CHEM ANLYZR: CPT

## 2018-06-26 PROCEDURE — 85025 COMPLETE CBC W/AUTO DIFF WBC: CPT

## 2018-06-26 PROCEDURE — 81003 URINALYSIS AUTO W/O SCOPE: CPT

## 2018-06-26 PROCEDURE — 99285 EMERGENCY DEPT VISIT HI MDM: CPT

## 2018-06-26 PROCEDURE — 96365 THER/PROPH/DIAG IV INF INIT: CPT

## 2018-06-26 PROCEDURE — 85730 THROMBOPLASTIN TIME PARTIAL: CPT

## 2018-06-26 PROCEDURE — 96372 THER/PROPH/DIAG INJ SC/IM: CPT

## 2018-06-26 PROCEDURE — 96366 THER/PROPH/DIAG IV INF ADDON: CPT

## 2018-06-26 PROCEDURE — 85610 PROTHROMBIN TIME: CPT

## 2018-06-26 PROCEDURE — 96375 TX/PRO/DX INJ NEW DRUG ADDON: CPT

## 2018-06-26 PROCEDURE — 80076 HEPATIC FUNCTION PANEL: CPT

## 2018-06-26 PROCEDURE — 80048 BASIC METABOLIC PNL TOTAL CA: CPT

## 2018-06-26 PROCEDURE — 93005 ELECTROCARDIOGRAM TRACING: CPT

## 2018-06-27 LAB
ALBUMIN SERPL BCP-MCNC: 4.3 G/DL (ref 3.4–5)
ALCOHOL SERUM/PLASMA: 11 MG/DL (ref 0–3)
ALP SERPL-CCNC: 66 U/L (ref 45–117)
ALT SERPL W P-5'-P-CCNC: 21 U/L (ref 12–78)
AST SERPL W P-5'-P-CCNC: 20 U/L (ref 15–37)
BUN BLD-MCNC: 12 MG/DL (ref 7–18)
BUN BLD-MCNC: 7 MG/DL (ref 7–18)
GLUCOSE SERPLBLD-MCNC: 137 MG/DL (ref 74–106)
GLUCOSE SERPLBLD-MCNC: 93 MG/DL (ref 74–106)
HCT VFR BLD CALC: 44.8 % (ref 39.6–49)
INR BLD: 1.15
LYMPHOCYTES # SPEC AUTO: 1.5 K/UL (ref 0.7–4.9)
MCH RBC QN AUTO: 30.3 PG (ref 27–35)
MCV RBC: 89.8 FL (ref 80–100)
METHAMPHET UR QL SCN: POSITIVE
PMV BLD: 7.7 FL (ref 7.6–11.3)
POTASSIUM SERPL-SCNC: 2.7 MMOL/L (ref 3.5–5.1)
POTASSIUM SERPL-SCNC: 4.2 MMOL/L (ref 3.5–5.1)
RBC # BLD: 4.99 M/UL (ref 4.33–5.43)
THC SERPL-MCNC: POSITIVE NG/ML

## 2018-06-27 NOTE — ER
Nurse's Notes                                                                                     

 [facility]                                                                

Name: Maulik Jones                                                                              

Age: 38 yrs                                                                                       

Sex: Male                                                                                         

: 1980                                                                                   

MRN: [id number]                                                                                   

Arrival Date: 2018                                                                          

Time: 23:24                                                                                       

Account#: [id number]                                                                            

Bed 18                                                                                            

Private MD:                                                                                       

Diagnosis: Suicidal ideations;Abuse of non-psychoactive substances;Adverse effect of amphetamines 

                                                                                                  

Presentation:                                                                                     

                                                                                             

23:25 Presenting complaint: EMS states: pt was running toward PD station with suicidal        ak1 

      ideations. pt stated "he is going crazy, someone was chasing him last night" pt with hx     

      SI. pt denies attempts of harm at this time. pt stated he "only has thoughts" pt calm       

      and cooperative at this time. Transition of care: patient was not received from another     

      setting of care. Onset of symptoms is unknown. Risk Assessment: Do you want to hurt         

      yourself or someone else? Patient reports desire/thoughts of hurting themselves or          

      someone else. Provider notified. Initial Sepsis Screen: Does the patient meet any 2         

      criteria? No. Patient's initial sepsis screen is negative. Does the patient have a          

      suspected source of infection? No. Patient's initial sepsis screen is negative. Note pt     

      with police at bedside. Care prior to arrival: None.                                        

23:25 Method Of Arrival: EMS: [facility]                                                       ak1 

23:25 Acuity: VINCENT 2                                                                           ak1 

                                                                                                  

Triage Assessment:                                                                                

23:30 General: Appears in no apparent distress. Behavior is calm, cooperative. Pain: Denies   ak1 

      pain. EENT: No signs and/or symptoms were reported regarding the EENT system. Neuro:        

      Level of Consciousness is awake, alert, obeys commands, Oriented to person, place,          

      time, situation,  are equal bilaterally Moves all extremities. Gait is steady,         

      Speech is normal, Facial symmetry appears normal. Cardiovascular: No deficits noted.        

      Respiratory: No deficits noted. GI: No signs and/or symptoms were reported involving        

      the gastrointestinal system. : No signs and/or symptoms were reported regarding the       

      genitourinary system. Derm: No signs and/or symptoms reported regarding the                 

      dermatologic system. Musculoskeletal: No signs and/or symptoms reported regarding the       

      musculoskeletal system.                                                                     

                                                                                             

19:20 General: Denies SOB,CP, no other complaints at this time.                               kk5 

                                                                                                  

Historical:                                                                                       

- Allergies:                                                                                      

                                                                                             

23:30 No Known Allergies;                                                                     ak1 

- Home Meds:                                                                                      

23:30 None [Active];                                                                          ak1 

- PMHx:                                                                                           

23:30 Anxiety; Bipolar disorder; PTSD; Depression;                                            ak1 

- PSHx:                                                                                           

23:30 None;                                                                                   ak1 

                                                                                                  

- Immunization history:: Adult Immunizations unknown.                                             

- Social history:: Smoking status: unknown Patient uses street drugs, Methamphetamine             

  (Meth).                                                                                         

- Family history:: not pertinent, not pertinent.                                                  

- Ebola Screening: : No symptoms or risks identified at this time.                                

- Hospitalizations: : No recent hospitalization is reported.                                      

                                                                                                  

                                                                                                  

Screenin:34 Abuse screen: Denies threats or abuse. Denies injuries from another. Nutritional        ak1 

      screening: No deficits noted. Tuberculosis screening: No symptoms or risk factors           

      identified. Fall Risk None identified.                                                      

                                                                                                  

Assessment:                                                                                       

23:35 Reassessment: Patient appears in no apparent distress at this time. No changes from     ak1 

      previously documented assessment. see triage assessment.                                    

                                                                                             

00:11 Reassessment: Patient appears in no apparent distress at this time. No changes from     ak1 

      previously documented assessment. Patient and/or family updated on plan of care and         

      expected duration. Pain level reassessed. pt sister and niece at bedside. [place] [employer] left     

      due to pt cooperativeness. .                                                                

01:24 Reassessment: Patient appears in no apparent distress at this time. No changes from     ak1 

      previously documented assessment. Patient and/or family updated on plan of care and         

      expected duration. Pain level reassessed. pt and family at bedside updated on transfer      

      process and the length of time.                                                             

02:10 Reassessment: Patient appears in no apparent distress at this time. No changes from     ak1 

      previously documented assessment. Patient and/or family updated on plan of care and         

      expected duration. Pain level reassessed. Patient is alert, oriented x 3, equal             

      unlabored respirations, skin warm/dry/pink.                                                 

02:34 Reassessment: Sister, Kristie Dorado, left phone number for any questions or concerns;        lp1 

      [phone].                                                                               

03:33 Reassessment: Patient appears in no apparent distress at this time. No changes from     ak1 

      previously documented assessment. Patient and/or family updated on plan of care and         

      expected duration. Pain level reassessed. Patient is alert, oriented x 3, equal             

      unlabored respirations, skin warm/dry/pink. pt stated he wanted to "check" himself out.     

      pt was informed again about the long term warrant and the transfer process.                 

03:57 Reassessment: Patient appears in no apparent distress at this time. No changes from     ak1 

      previously documented assessment. Patient and/or family updated on plan of care and         

      expected duration. Pain level reassessed. Patient is alert, oriented x 3, equal             

      unlabored respirations, skin warm/dry/pink. pt threatened nurse if she came close to        

      given Ativan IV. pt informed again that he had the intermediate warrant and the the police     

      would be contacted if he choose not to cooperate.                                           

04:10 Reassessment: pt now asking for Ativan. pt stated he was "pissed off and didn't want it ak1 

      right then" pt stated he now "wants his shot".                                              

04:28 Reassessment: [facility] screener at bedside. .                                    ak1 

06:02 Reassessment: pt removed IV, refused to stay, left out ER back doors in hospital gown   ak1 

      and shorts. pt was told again prior to his leaving he had a intermediate warrant and that      

      [place] [employer] would be on the way. [employer] dispatch contacted..                   

06:16 Reassessment: [employer] arrived and notified of mental health intermediate     ak1 

      warrant as well as pt's threats to staff. pt description given to officers. .               

06:40 Reassessment: pt returned by [place] [employer] in handcuffs. [employer] officer stated  ak1 

      they have contacted mental health officer. [employer] stated they would stay with      

      pt until mental health deputy shows up to ER.                                               

06:40 Reassessment: [facility] officer stated Mental Health contacted them back stating  ak1 

      it is the hospitals responsibility to "restrain" the patient. The  officer was            

      informed of the previous attempt to give the pt Ativan IV, when pt threatened nurse.        

      Officer stated he would keep pt hand cuffed for the administration of chemical              

      restraint of pt. Officer informed of the need for a physicians order to chemically          

      restrain pt and the possible need for new IV access to preform restraint.                   

07:02 Reassessment: Dr. Austin gave orders to charge nurse for IM Ativan with orders to wait   ak1 

      30 minuets. [employer] officers agreed to wait for 30 minuets then uncuff pt and see      

      if pt is more corporative. report given to CARMELINA Huffman RN and Meghan MENDIETA RN.                      

07:05 Reassessment: Pt currently in handcuffs by [employer]. 2 PD officers at bedside,   aa5 

      pt lying down in bed resting with eyes closed, respirations are even and unlabored,         

      skin is pink/warm/dry. .                                                                    

07:20 Reassessment: Dr. Erwin at bedside. Pt uncuffed by PD. Pt cooperative at this time.  aa5 

      Pt appears calm. Pt states "I felt suicidal only because I thought people were chasing      

      me". When asked if he remains suicidal pt refuses to answer, pt states "I don't know        

      what I am thinking, I just want to sleep". Pt lying down in bed at this time, warm          

      blankets given. .                                                                           

07:20 General: Appears comfortable, Behavior is calm, cooperative. Pain: Denies pain. Neuro:  aa5 

      Level of Consciousness is awake, alert, obeys commands, Oriented to person, place,          

      time, situation. Cardiovascular: Heart tones S1 S2 present Rhythm is regular.               

      Respiratory: Airway is patent Respiratory effort is even, unlabored, Respiratory            

      pattern is regular, symmetrical, Breath sounds are clear bilaterally. GI: No signs          

      and/or symptoms were reported involving the gastrointestinal system. Abdomen is flat,       

      non-distended, Bowel sounds present X 4 quads. Abd is soft and non tender X 4 quads.        

      : No signs and/or symptoms were reported regarding the genitourinary system. EENT: No     

      signs and/or symptoms were reported regarding the EENT system. Derm: Skin is pink, warm     

      \T\ dry. Musculoskeletal: Range of motion: intact in all extremities.                       

07:30 Reassessment: Pt offered breakfast, pt denied. Pt requesting orange juice. Pt drank 220 aa5 

      cc of orange juice. .                                                                       

07:44 Reassessment: Pt now resting in bed with eyes closed, respirations even and unlabored,  aa5 

      skin is pink/warm/dry. Bed locked in low position, side rails x2. Sitter at bedside .       

08:45 Reassessment: Pt remains resting with eyes closed, respirations even and unlabored,     aa5 

      skin is pink/warm/dry. Pt awakens to verbal stimuli. Pt notified of need for IV             

      insertion for potassium administration, pt verbalizes understanding and agrees to IV        

      insertion. .                                                                                

09:00 Reassessment: Patient is alert, oriented x 3, equal unlabored respirations, skin        aa5 

      warm/dry/pink. Pt offered breakfast, pt refused. .                                          

10:35 Reassessment: Pt resting in bed with eyes closed, respirations are even and unlabored,  aa5 

      skin is pink/warm/dry. Pt easy to arouse to verbal stimuli. Bed remains locked in low       

      position, side rails x 2. .                                                                 

11:30 Reassessment: Pt resting in bed with eyes closed, respiration even and unlabored, skin  aa5 

      is pink/warm/dry.                                                                           

12:30 Reassessment: Patient is alert, oriented x 3, equal unlabored respirations, skin        aa5 

      warm/dry/pink. Pt ate 50% of lunch, pt tolerated well. .                                    

13:15 Reassessment: Pt currently resting in bed with eyes closed, respirations even and       aa5 

      unlabored, skin is pink/warm/dry. Pt easy to awake to verbal stimuli .                      

14:00 Reassessment: Pt resting in bed with eyes closed, respirations even and unlabored, skin aa5 

      is pink/warm/dry. .                                                                         

15:00 Reassessment: Pt resting in bed with eyes closed. Respirations even and unlabored, skin aa5 

      is pink/warm/dry. Pt easy to awake to verbal stimuli. Pt reports suicidal thoughts, pt      

      states "I do want to be transferred because I know I need help". Pt states "I cut my        

      wrist on my previous suicide attempt". Pt states "I used to take psych pills but I          

      stopped about 6 months ago because I hate taking pills". Pt notified of wait time for       

      transfer approval and notified that is normally a long wait time to be transferred, pt      

      verbalizes understanding. Pt's sister at bedside. .                                         

16:00 Reassessment: Pt remains resting in bed with eyes closed, respirations even and         aa5 

      unlabored, skin is pink/warm/dry. .                                                         

17:00 Reassessment: Resting in bed with eyes closed, respirations are even and unlabored,     aa5 

      skin warm/dry/pink. .                                                                       

18:00 Reassessment: Pt remains resting in bed with eyes closed, respirations even and         aa5 

      unlabored, skin is pink/warm/dry.                                                           

19:00 Reassessment: Patient appears in no apparent distress at this time. Patient and/or      tl2 

      family updated on plan of care and expected duration. Pain level reassessed. Patient is     

      alert, oriented x 3, equal unlabored respirations, skin warm/dry/pink. General: Appears     

      in no apparent distress. comfortable, Behavior is calm, cooperative, appropriate for        

      age. Pain: Denies pain. Neuro: Level of Consciousness is awake, alert, obeys commands,      

      Oriented to person, place, time, situation. Cardiovascular: Rhythm is regular.              

      Respiratory: Airway is patent Respiratory effort is even, unlabored, Respiratory            

      pattern is regular, symmetrical. GI: No signs and/or symptoms were reported involving       

      the gastrointestinal system. : No signs and/or symptoms were reported regarding the       

      genitourinary system. Derm: Skin is pink, warm \T\ dry.                                     

21:55 Reassessment: Patient appears in no apparent distress at this time. Patient and/or      tl2 

      family updated on plan of care and expected duration. Pain level reassessed. Patient is     

      alert, oriented x 3, equal unlabored respirations, skin warm/dry/pink. Pt resting, no       

      questions or complaints at this time. Pt is cooperative.                                    

                                                                                             

00:00 Reassessment: Patient appears in no apparent distress at this time. Pt remains asleep,  tl2 

      RR even and unlabored.                                                                      

02:00 Reassessment: Patient appears in no apparent distress at this time. Pt remains asleep,  tl2 

      RR even and unlabored.                                                                      

04:00 Reassessment: Patient appears in no apparent distress at this time.                     tl2 

07:05 Reassessment: Patient appears in no apparent distress at this time. Patient and/or      hb  

      family updated on plan of care and expected duration. Pain level reassessed. Patient is     

      alert, oriented x 3, equal unlabored respirations, skin warm/dry/pink. Sitter at            

      bedside.                                                                                    

08:00 Reassessment: Patient appears in no apparent distress at this time. No changes from     hb  

      previously documented assessment. Patient and/or family updated on plan of care and         

      expected duration. Pain level reassessed. Patient is alert, oriented x 3, equal             

      unlabored respirations, skin warm/dry/pink. Sitter remains at bedside.                      

09:00 General: Appears comfortable, Behavior is calm, cooperative. Pain: Denies pain. Neuro:  aa5 

      Level of Consciousness is awake, alert, obeys commands, Oriented to person, place,          

      time, situation. Cardiovascular: Heart tones S1 S2 present Rhythm is regular.               

      Respiratory: Airway is patent Respiratory effort is even, unlabored, Respiratory            

      pattern is regular, symmetrical, Breath sounds are clear bilaterally. GI: Abdomen is        

      flat, non-distended, Bowel sounds present X 4 quads. Abd is soft and non tender X 4         

      quads. : No signs and/or symptoms were reported regarding the genitourinary system.       

      EENT: No signs and/or symptoms were reported regarding the EENT system. Derm: Skin is       

      pink, warm \T\ dry. Musculoskeletal: Range of motion: intact in all extremities.            

09:00 Reassessment: Pt's care will be continued by me at this time. .                         aa5 

10:00 Reassessment: Pt resting in bed with eyes closed, respirations are even and unlabored,  aa5 

      skin is pink/warm/dry. .                                                                    

11:00 Reassessment: Pt resting in bed with eyes closed, respirations are even and unlabored,  aa5 

      skin is pink/warm/dry. .                                                                    

11:15 Reassessment: Pt's care will be continued by Karen Rivers RN.                        aa5 

11:16 Reassessment: Patient appears in no apparent distress at this time. Patient and/or        

      family updated on plan of care and expected duration. Pain level reassessed. Patient is     

      alert, oriented x 3, equal unlabored respirations, skin warm/dry/pink. Sitter remains       

      at bedside.                                                                                 

12:00 Reassessment: Patient appears in no apparent distress at this time. No changes from     hb  

      previously documented assessment. Patient and/or family updated on plan of care and         

      expected duration. Pain level reassessed. Patient is alert, oriented x 3, equal             

      unlabored respirations, skin warm/dry/pink. Sitter remains at bedside.                      

13:00 Reassessment: Patient appears in no apparent distress at this time. No changes from     hb  

      previously documented assessment. Patient and/or family updated on plan of care and         

      expected duration. Pain level reassessed. Patient is alert, oriented x 3, equal             

      unlabored respirations, skin warm/dry/pink. Sitter remains at bedside.                      

14:00 Reassessment: Pt resting with eyes closed. Respirations even and unlabored. Skin is     hb  

      pink, warm, and dry. Sitter remains at bedside.                                             

15:00 Reassessment: Patient appears in no apparent distress at this time. No changes from     hb  

      previously documented assessment. Patient and/or family updated on plan of care and         

      expected duration. Pain level reassessed.                                                   

15:58 Reassessment: Patient appears in no apparent distress at this time. Patient and/or      hb  

      family updated on plan of care and expected duration. Pain level reassessed. Pt resting     

      with eyes closed. Sitter remains at bedside.                                                

17:30 Reassessment: pt has had no other outbursts or attempts to leave since 0640 on 18. iw  

      Pt has been cooperative and requesting to be sent to psych facility for suicidal            

      ideation, pt states that if he is left alone he will try to do something to harm            

      himself because he starts thinking about his son who was killed. Pt has never been          

      restrained by ER staff, pt was only in handcuffs when police escorted pt back to the ER     

      on 18, once pt was uncuffed at 0720 on 18 he has been cooperative and willing     

      to be transferred to psych facility, pt is apologetic about his behavior and states         

      that he is normally very cooperative with authority figures.                                

18:30 Reassessment: Patient appears in no apparent distress at this time. No changes from     hb  

      previously documented assessment. Patient and/or family updated on plan of care and         

      expected duration. Pain level reassessed. Patient is alert, oriented x 3, equal             

      unlabored respirations, skin warm/dry/pink.                                                 

19:24 Reassessment: Patient appears in no apparent distress at this time. No changes from     jd3 

      previously documented assessment. Patient and/or family updated on plan of care and         

      expected duration. Pain level reassessed. Patient is alert, oriented x 3, equal             

      unlabored respirations, skin warm/dry/pink. pt is cooperative, family at bedside, no        

      distress noted at this time.                                                                

20:10 Reassessment: Patient appears in no apparent distress at this time. No changes from     jd3 

      previously documented assessment. Patient and/or family updated on plan of care and         

      expected duration. Pain level reassessed. Patient is alert, oriented x 3, equal             

      unlabored respirations, skin warm/dry/pink.                                                 

20:21 Reassessment: pt asking for medication to aid with sleep, provider notified, no new     jd3 

      orders at this time.                                                                        

21:25 Reassessment: Patient appears in no apparent distress at this time. No changes from     jd3 

      previously documented assessment. Patient and/or family updated on plan of care and         

      expected duration. Pain level reassessed. Patient is alert, oriented x 3, equal             

      unlabored respirations, skin warm/dry/pink. Patient denies pain at this time.               

22:30 Reassessment: Patient appears in no apparent distress at this time. No changes from     jd3 

      previously documented assessment. Patient and/or family updated on plan of care and         

      expected duration. Pain level reassessed. Patient is alert, oriented x 3, equal             

      unlabored respirations, skin warm/dry/pink. Patient denies pain at this time.               

23:06 Reassessment: pt reported wanting to wait for the sleep until midnight. provider        jd3 

      notified.                                                                                   

                                                                                             

00:00 Reassessment: Patient appears in no apparent distress at this time. Patient and/or      jd3 

      family updated on plan of care and expected duration. Pain level reassessed. Patient is     

      alert, oriented x 3, equal unlabored respirations, skin warm/dry/pink. pt sleeping,         

      eyes closed, even and unlabored respirations, sitter at bedside. no distress noted at       

      this time.                                                                                  

01:00 Reassessment: Patient appears in no apparent distress at this time. No changes from     jd3 

      previously documented assessment. Patient and/or family updated on plan of care and         

      expected duration. Pain level reassessed. Patient is alert, oriented x 3, equal             

      unlabored respirations, skin warm/dry/pink.                                                 

02:00 Reassessment: Patient appears in no apparent distress at this time. No changes from     jd3 

      previously documented assessment. Patient and/or family updated on plan of care and         

      expected duration. Pain level reassessed. Patient is alert, oriented x 3, equal             

      unlabored respirations, skin warm/dry/pink.                                                 

03:00 Reassessment: Patient appears in no apparent distress at this time. No changes from     jd3 

      previously documented assessment. Patient and/or family updated on plan of care and         

      expected duration. Pain level reassessed. Patient is alert, oriented x 3, equal             

      unlabored respirations, skin warm/dry/pink.                                                 

04:00 Reassessment: Patient appears in no apparent distress at this time. No changes from     jd3 

      previously documented assessment. Patient and/or family updated on plan of care and         

      expected duration. Pain level reassessed. Patient is alert, oriented x 3, equal             

      unlabored respirations, skin warm/dry/pink. Patient denies pain at this time.               

05:00 Reassessment: Patient appears in no apparent distress at this time. No changes from     jd3 

      previously documented assessment. Patient and/or family updated on plan of care and         

      expected duration. Pain level reassessed. Patient is alert, oriented x 3, equal             

      unlabored respirations, skin warm/dry/pink.                                                 

06:00 Reassessment: Patient appears in no apparent distress at this time. No changes from     jd3 

      previously documented assessment. Patient and/or family updated on plan of care and         

      expected duration. Pain level reassessed. Patient is alert, oriented x 3, equal             

      unlabored respirations, skin warm/dry/pink.                                                 

07:00 Reassessment: Patient appears in no apparent distress at this time. No changes from     jd3 

      previously documented assessment. Patient and/or family updated on plan of care and         

      expected duration. Pain level reassessed. Patient is alert, oriented x 3, equal             

      unlabored respirations, skin warm/dry/pink. bedside report given to Laz SORIANO.              

07:00 Reassessment: bedside report with SELWYN Dickinson. General: Appears in no apparent         em  

      distress. comfortable, Behavior is calm, cooperative. Cardiovascular: Capillary refill      

      < 3 seconds Patient's skin is warm and dry. Respiratory: Airway is patent Respiratory       

      effort is even, unlabored, Respiratory pattern is regular, symmetrical. Derm: Skin is       

      intact, Skin is pink, warm \T\ dry. Musculoskeletal: Range of motion: intact in all         

      extremities.                                                                                

07:30 Reassessment: Patient appears in no apparent distress at this time. Patient and/or      em  

      family updated on plan of care and expected duration. Pain level reassessed. Patient is     

      alert, oriented x 3, equal unlabored respirations, skin warm/dry/pink. reports,             

      "feeling better and wants help," currently denies SI or HI.                                 

08:41 Reassessment: report called to Kristie Levin RN at [facility], awaiting EMS      em  

      transportaion.                                                                              

09:00 Reassessment: pt asked for wallet and shirt, security called and there is no evidence   em  

      of wallet or shirt in safe, [facility] contacted and they informed that wallet was           

      transferred with pt, pt has not seen wallet since EMS transportation, reports one of        

      the nurse threw away the shirt because it was torn up, charged nurse notified.              

09:14 Reassessment: report given to  EMS, will transport pt to facility.                    em  

                                                                                                  

Psych:                                                                                            

                                                                                             

23:32 Subjective: Patient's mood is angry, irritable, Delusions are being chased by someone   ak1 

      or something Hallucinations are visual, Having thoughts of suicide. Denies suicidal         

      plan. Objective: Patient is cooperative, using poor eye contact, Speech is normal.          

      Interventions: Removed personal items and placed in bag. Patient placed in hospital         

      gown. Searched person for dangerous items. Suicide Risk Assessment: Sad Person Scale:       

      Sex of patient: Male: Score 1 point. Age of patient: Score 0 point if patient falls         

      outside of specified age parameters. Depression: Score 1 point if signs of depression       

      are present. Previous Attempt: Score 1 point if patient has previously attempted            

      suicide. Substance Abuse: Score 1 point if patient abuses alcohol or drugs. Rational        

      Thinking: Score 1 point if patient is lacking rational thinking. Social Support: Score      

      1 point if social support is lacking and/or unavailable. Organized Plan: Score 0 if         

      patient did not have an organized plan in place. Relationship: Score 1 point if patient     

      is , , , or for a single male Chronic Sickness: Score 0 point       

      if patient does not have a chronic illness, debilitating, or severe disorder. Safety        

      Checks: Personal items have been removed. Door is open. Patient uses methamphetamines       

      Last use was tonight. Commitment: Patient will be an involuntary commitment. Mental         

      health deputy at bedside for long term warrant.                                             

                                                                                             

19:00 Safety Checks: Personal items have been removed. Door is open. Visitors are present.    kk5 

19:14 Safety Checks: Personal items have been removed. Door is open. Visitors are present.    kk5 

19:44 Safety Checks: Personal items have been removed. Door is open. Visitors are present.    kk5 

      Additional family present bedside. Pt alert, talking. cooperative.                          

20:30 Safety Checks: Personal items have been removed. Door is open. Visitors are present.    kk5 

      V/S wnl, pt denies auditory/visual disturbance of any kind. Denies HA, CP, SOB.             

21:57 Safety Checks: Personal items have been removed. Door is open. No visitors are present  kk5 

      at this time. pt resting with eyes closed, respirations even and unlabored.                 

22:23 Safety Checks: Personal items have been removed. Door is open. No visitors are present  kk5 

      at this time.                                                                               

22:40 Safety Checks: Personal items have been removed. Door is open.                          kk5 

23:34 Safety Checks: Personal items have been removed. Door is open. No visitors are present  kk5 

      at this time. pt lying prone, snoring, no apparent distress noted.                          

23:59 Safety Checks: Personal items have been removed. Door is open.                          kk5 

                                                                                             

00:10 Safety Checks: Personal items have been removed. Door is open. No visitors are present  kk5 

      at this time.                                                                               

01:34 Safety Checks: Personal items have been removed. Door is open.                          kk5 

02:09 Safety Checks: Personal items have been removed. Door is open.                          kk5 

02:28 Safety Checks: Personal items have been removed. Door is open.                          kk5 

03:03 Safety Checks: Personal items have been removed. Door is open.                          kk5 

03:36 Safety Checks: Personal items have been removed. Door is open. pt resting with eyes     kk5 

      closed, respirations even and unlabored.                                                    

03:55 Safety Checks: Personal items have been removed. Door is open.                          kk5 

04:34 Safety Checks: Personal items have been removed. Door is open.                          kk5 

04:55 Safety Checks: Personal items have been removed. Door is open.                          kk5 

05:18 Safety Checks: Personal items have been removed. Door is open.                          kk5 

05:33 Safety Checks: Personal items have been removed. Door is open.                          kk5 

05:45 Safety Checks: Personal items have been removed. Door is open.                          kk5 

06:09 Safety Checks: Personal items have been removed. Door is open. Pt alert and oriented.   kk5 

      Cooperative.                                                                                

06:27 Safety Checks: Personal items have been removed. Door is open.                          kk5 

06:58 Safety Checks: Personal items have been removed. Door is open. Pt resting, respirations kk5 

      even and unlabored.                                                                         

                                                                                                  

Vital Signs:                                                                                      

                                                                                             

23:30  / 99; Pulse 108; Resp 18; Temp 98.1; Pulse Ox 100% on R/A; Weight 68.04 kg (R);  ak1 

      Height 5 ft. 8 in. (172.72 cm) (R); Pain 0/10;                                              

                                                                                             

00:12  / 86; Pulse 86; Resp 18; Pulse Ox 100% on R/A; Pain 0/10;                        ak1 

01:40  / 83; Pulse 100; Resp 16; Pulse Ox 99% on R/A; Pain 0/10;                        ak1 

02:15  / 77; Pulse 100; Resp 16; Temp 98.2; Pulse Ox 100% on R/A; Pain 0/10;            ak1 

02:57  / 82; Pulse 110; Resp 18; Pulse Ox 99% on R/A; Pain 0/10;                        ak1 

03:34  / 83; Pulse 100; Resp 18; Pulse Ox 100% on R/A; Pain 0/10;                       ak1 

07:30  / 90; Pulse 120; Resp 20 S; Temp 98.0(O); Pulse Ox 98% on R/A; Pain 0/10;        aa5 

09:00  / 81; Pulse 85; Resp 16 S; Pulse Ox 100% on R/A;                                 aa5 

11:00  / 74; Pulse 80; Resp 18 S; Pulse Ox 100% on R/A; Pain 0/10;                      aa5 

13:20 BP 91 / 60; Pulse 78; Resp 16 S; Temp 98.3(O); Pulse Ox 100% on R/A; Pain 0/10;         aa5 

14:53 BP 95 / 59; Pulse 73; Resp 18; Pulse Ox 100% on R/A;                                    ag  

15:15  / 72; Pulse 78; Resp 16 S; Pulse Ox 100% on R/A;                                 aa5 

15:30  / 68; Pulse 74; Resp 16 S; Pulse Ox 100% on R/A;                                 aa5 

15:45  / 68; Pulse 73; Resp 16 S; Pulse Ox 100% on R/A;                                 aa5 

16:00  / 70; Pulse 74; Resp 16 S; Pulse Ox 100% on R/A;                                 aa5 

16:23  / 68; Pulse 72; Resp 18 S; Temp 98.0(TE); Pulse Ox 100% on R/A; Pain 0/10;       aa5 

18:48  / 71; Pulse 88; Resp 17; Pulse Ox 100% on R/A; Pain 0/10;                        tl1 

19:20  / 73; Pulse 84; Resp 12; Pulse Ox 100% ;                                         kk5 

20:03  / 69; Pulse 82; Resp 12; Pulse Ox 100% ;                                         kk5 

                                                                                             

02:08 BP 98 / 57; Pulse 70; Resp 12; Temp 97.8; Pulse Ox 100% on R/A;                         kk5 

06:07 BP 93 / 57; Pulse 69; Resp 12; Temp 97.8; Pulse Ox 99% on R/A; Pain 0/10;               kk5 

07:14 BP 91 / 62; Pulse 80; Resp 12; Temp 97.6; Pulse Ox 98% on R/A; Pain 0/10;               em1 

09:45 BP 98 / 70; Pulse 60; Resp 16 S; Temp 98.7(O); Pulse Ox 100% on R/A; Pain 0/10;         aa5 

12:19 BP 98 / 58; Pulse 68; Resp 16; Pulse Ox 100% ;                                          hb  

16:00  / 66; Pulse 70; Resp 15; Pulse Ox 100% on R/A; Pain 0/10;                        hb  

20:00 BP 99 / 60; Pulse 69; Resp 19 S; Temp 98.0(O); Pulse Ox 97% on R/A; Pain 0/10;          jd3 

                                                                                             

00:00  / 69; Pulse 65; Resp 18; Temp 97.9; Pulse Ox 98% ; Pain 0/10;                    td  

04:00 BP 91 / 62; Pulse 71; Resp 18; Temp 98.1; Pulse Ox 98% ; Pain 0/10;                     td  

08:10 BP 98 / 59; Pulse 67; Resp 18; Temp 97.8(TE); Pulse Ox 100% on R/A; Pain 0/10;          em  

                                                                                             

23:30 Body Mass Index 22.81 (68.04 kg, 172.72 cm)                                             ak1 

                                                                                             

13:20 Dr. Erwin notified of decreased BP                                                   aa5 

                                                                                                  

ED Course:                                                                                        

                                                                                             

23:24 Patient arrived in ED.                                                                  ak1 

23:25 Jesús Austin MD is Attending Physician.                                                rn  

23:28 Triage completed.                                                                       ak1 

23:30 Safety Checks: Personal items have been removed. The door is open or patient has been   ak1 

      placed in a hallway bed/chair. Sitter present at this time.                                 

23:30 Arm band placed on Patient placed in an exam room, on a stretcher, Patient notified of  ak1 

      wait time.                                                                                  

23:30 Inserted saline lock: 20 gauge in right forearm, using aseptic technique. Blood         lp1 

      collected.                                                                                  

23:34 Patient has correct armband on for positive identification. Placed in gown. Bed in low  ak1 

      position. Call light in reach. Side rails up X2. Pulse ox on. NIBP on.                      

23:45 Safety Checks: Personal items have been removed. The door is open or patient has been   ak1 

      placed in a hallway bed/chair. Sitter present at this time.                                 

                                                                                             

00:00 Safety Checks: Personal items have been removed. The door is open or patient has been   ak1 

      placed in a hallway bed/chair. Sitter present at this time.                                 

00:11 Iwona Bingham, RN is Primary Nurse.                                                     ak1 

00:15 Safety Checks: Personal items have been removed. The door is open or patient has been   ak1 

      placed in a hallway bed/chair. A family member and/or friend is present and encouraged      

      to stay. pt sister at bedside. Sitter present at this time.                                 

00:30 Safety Checks: Personal items have been removed. The door is open or patient has been   ak1 

      placed in a hallway bed/chair. A family member and/or friend is present and encouraged      

      to stay. Sitter present at this time.                                                       

00:36 Notified ED physician of a critical lab result(s). potassium of 2.7 Dr. Austin notified. bb  

00:45 Safety Checks: Personal items have been removed. The door is open or patient has been   ak1 

      placed in a hallway bed/chair. A family member and/or friend is present and encouraged      

      to stay. Sitter present at this time.                                                       

01:00 Safety Checks: Personal items have been removed. The door is open or patient has been   ak1 

      placed in a hallway bed/chair. A family member and/or friend is present and encouraged      

      to stay. Sitter present at this time.                                                       

01:15 Safety Checks: Personal items have been removed. The door is open or patient has been   ak1 

      placed in a hallway bed/chair. A family member and/or friend is present and encouraged      

      to stay. Sitter present at this time.                                                       

01:26 No provider procedures requiring assistance completed.                                  ak1 

01:30 Safety Checks: Personal items have been removed. The door is open or patient has been   ak1 

      placed in a hallway bed/chair. A family member and/or friend is present and encouraged      

      to stay. Sitter present at this time.                                                       

01:45 Safety Checks: Personal items have been removed. The door is open or patient has been   ak1 

      placed in a hallway bed/chair. A family member and/or friend is present and encouraged      

      to stay. Sitter present at this time.                                                       

02:00 Safety Checks: Personal items have been removed. The door is open or patient has been   ak1 

      placed in a hallway bed/chair. A family member and/or friend is present and encouraged      

      to stay. Sitter present at this time.                                                       

02:15 Safety Checks: Personal items have been removed. The door is open or patient has been   ak1 

      placed in a hallway bed/chair. There are no family/friend visitors at this time Sitter      

      present at this time.                                                                       

02:30 Safety Checks: Personal items have been removed. The door is open or patient has been   ak1 

      placed in a hallway bed/chair. There are no family/friend visitors at this time Sitter      

      present at this time.                                                                       

02:45 Safety Checks: Personal items have been removed. The door is open or patient has been   ak1 

      placed in a hallway bed/chair. There are no family/friend visitors at this time Sitter      

      present at this time.                                                                       

03:00 Safety Checks: Personal items have been removed. The door is open or patient has been   ak1 

      placed in a hallway bed/chair. There are no family/friend visitors at this time Sitter      

      present at this time.                                                                       

03:15 Safety Checks: Personal items have been removed. The door is open or patient has been   ak1 

      placed in a hallway bed/chair. There are no family/friend visitors at this time Sitter      

      present at this time.                                                                       

03:30 Safety Checks: Personal items have been removed. The door is open or patient has been   ak1 

      placed in a hallway bed/chair. There are no family/friend visitors at this time Sitter      

      present at this time.                                                                       

03:45 Safety Checks: Personal items have been removed. The door is open or patient has been   ak1 

      placed in a hallway bed/chair. There are no family/friend visitors at this time Sitter      

      present at this time.                                                                       

04:00 Safety Checks: Personal items have been removed. The door is open or patient has been   ak1 

      placed in a hallway bed/chair. There are no family/friend visitors at this time Sitter      

      present at this time.                                                                       

04:15 Safety Checks: Personal items have been removed. The door is open or patient has been   ak1 

      placed in a hallway bed/chair. There are no family/friend visitors at this time Sitter      

      present at this time.                                                                       

04:30 Safety Checks: Personal items have been removed. The door is open or patient has been   ak1 

      placed in a hallway bed/chair. There are no family/friend visitors at this time Sitter      

      present at this time.                                                                       

04:45 Safety Checks: Personal items have been removed. The door is open or patient has been   ak1 

      placed in a hallway bed/chair. There are no family/friend visitors at this time Sitter      

      present at this time.                                                                       

05:00 Safety Checks: Personal items have been removed. The door is open or patient has been   ak1 

      placed in a hallway bed/chair. There are no family/friend visitors at this time Sitter      

      present at this time.                                                                       

05:15 Safety Checks: Personal items have been removed. The door is open or patient has been   ak1 

      placed in a hallway bed/chair. There are no family/friend visitors at this time Sitter      

      present at this time.                                                                       

05:30 Safety Checks: Personal items have been removed. The door is open or patient has been   ak1 

      placed in a hallway bed/chair. Sitter present at this time.                                 

05:45 Safety Checks: Personal items have been removed. The door is open or patient has been   ak1 

      placed in a hallway bed/chair. There are no family/friend visitors at this time Sitter      

      present at this time.                                                                       

06:03 pt removed his own IV.                                                                  ak1 

06:40 Primary Nurse role handed off by Iwona Bingham RN                                      ak1 

07:00 Sitter at bedside.                                                                      ag  

07:30 Safety Checks: The door is open or patient has been placed in a hallway bed/chair.      aa5 

      There are no family/friend visitors at this time Sitter present at this time.               

07:32 Meghan Reyna, RN is Primary Nurse.                                                   aa5 

07:32 Attending Physician role handed off by Jesús Austin MD                                 trina 

07:32 Nahum Erwin MD is Attending Physician.                                             trina 

07:45 Safety Checks: The door is open or patient has been placed in a hallway bed/chair.      aa5 

      There are no family/friend visitors at this time Sitter present at this time.               

08:00 Safety Checks: Personal items have been removed. The door is open or patient has been   ph  

      placed in a hallway bed/chair. There are no family/friend visitors at this time Sitter      

      present at this time.                                                                       

08:15 Safety Checks: Personal items have been removed. The door is open or patient has been   ph  

      placed in a hallway bed/chair. There are no family/friend visitors at this time Sitter      

      present at this time.                                                                       

08:30 Safety Checks: Personal items have been removed. The door is open or patient has been   ph  

      placed in a hallway bed/chair. There are no family/friend visitors at this time Sitter      

      present at this time.                                                                       

08:45 Safety Checks: Personal items have been removed. The door is open or patient has been   ph  

      placed in a hallway bed/chair. There are no family/friend visitors at this time Sitter      

      present at this time.                                                                       

08:50 Inserted saline lock: 18 gauge in right antecubital area, using aseptic technique.      aa5 

09:00 Safety Checks: Personal items have been removed. The door is open or patient has been   ph  

      placed in a hallway bed/chair. There are no family/friend visitors at this time Sitter      

      present at this time.                                                                       

09:15 Safety Checks: Personal items have been removed. The door is open or patient has been   ph  

      placed in a hallway bed/chair. There are no family/friend visitors at this time Sitter      

      present at this time.                                                                       

09:30 Safety Checks: Personal items have been removed. The door is open or patient has been   ph  

      placed in a hallway bed/chair. There are no family/friend visitors at this time Sitter      

      present at this time.                                                                       

09:45 Safety Checks: The door is open or patient has been placed in a hallway bed/chair.      aa5 

      There are no family/friend visitors at this time Sitter present at this time.               

09:45 Safety checks: Items removed: yes. Door open/sign placed on door: yes. Family/friend    ag  

      present: no.                                                                                

10:00 Safety Checks: The door is open or patient has been placed in a hallway bed/chair.      aa5 

      There are no family/friend visitors at this time Sitter present at this time.               

10:00 Safety checks: Items removed: yes. Door open/sign placed on door: yes. Family/friend    ag  

      present: no.                                                                                

10:00 Sitter at bedside.                                                                      ag  

10:15 Safety Checks: The door is open or patient has been placed in a hallway bed/chair.      aa5 

      There are no family/friend visitors at this time Sitter present at this time.               

10:30 Safety Checks: The door is open or patient has been placed in a hallway bed/chair.      aa5 

      There are no family/friend visitors at this time Sitter present at this time.               

10:45 Safety checks: Items removed: yes. Door open/sign placed on door: yes. Family/friend    ag  

      present: yes. Sitter at bedside.                                                            

11:00 Safety checks: Items removed: yes. Door open/sign placed on door: yes. Family/friend    ag  

      present: yes. Sitter at bedside.                                                            

11:18 Safety checks: Items removed: yes. Door open/sign placed on door: yes. Family/friend    ag  

      present: yes. Sitter at bedside.                                                            

11:30 Safety checks: Items removed: yes. Door open/sign placed on door: yes. Family/friend    ag  

      present: yes.                                                                               

11:30 Sitter at bedside.                                                                      ag  

11:45 Safety checks: Items removed: yes. Safety checks: Door open/sign placed on door: yes.   ag  

      Family/friend present: yes.                                                                 

12:00 Safety checks: Items removed: yes. Door open/sign placed on door: yes. Family/friend    ag  

      present: yes.                                                                               

12:15 Safety checks: Items removed: yes. Door open/sign placed on door: yes. Family/friend    ag  

      present: yes.                                                                               

12:30 Safety checks: Items removed: yes. Door open/sign placed on door: yes. Family/friend    ag  

      present: yes.                                                                               

12:45 Safety checks: Items removed: yes. Door open/sign placed on door: yes. Family/friend    ag  

      present: yes.                                                                               

13:15 Safety checks: Items removed: yes. Door open/sign placed on door: yes. Family/friend    ag  

      present: no.                                                                                

13:30 Safety checks: Items removed: yes. Door open/sign placed on door: yes. Family/friend    ag  

      present: no.                                                                                

13:45 Safety checks: Items removed: yes. Door open/sign placed on door: yes. Family/friend    ag  

      present: yes.                                                                               

14:00 Safety checks: Items removed: yes. Door open/sign placed on door: yes. Family/friend    ag  

      present: yes.                                                                               

14:15 Safety checks: Items removed: yes. Door open/sign placed on door: yes. Family/friend    ag  

      present: yes.                                                                               

14:15 Sitter at bedside.                                                                      ag  

14:30 Safety checks: Items removed: yes. Door open/sign placed on door: yes. Family/friend    ag  

      present: yes.                                                                               

14:45 Safety checks: Items removed: yes. Door open/sign placed on door: yes. Family/friend    ag  

      present: yes.                                                                               

15:00 Safety checks: Items removed: yes. Door open/sign placed on door: yes. Family/friend    ag  

      present: yes.                                                                               

15:15 Safety checks: Items removed: yes. Door open/sign placed on door: yes. Family/friend    ag  

      present: yes.                                                                               

15:30 Safety checks: Items removed: yes. Door open/sign placed on door: yes. Family/friend    ag  

      present: no.                                                                                

15:45 Safety checks: Items removed: yes. Door open/sign placed on door: yes. Family/friend    ag  

      present: no.                                                                                

16:00 Safety checks: Items removed: yes. Door open/sign placed on door: yes. Family/friend    ag  

      present: no.                                                                                

16:15 Safety checks: Items removed: yes. Door open/sign placed on door: yes. Family/friend    ag  

      present: no.                                                                                

16:15 Sitter at bedside.                                                                      ag  

16:30 Safety checks: Items removed: yes. Door open/sign placed on door: yes. Family/friend    ag  

      present: no.                                                                                

16:45 Safety checks: Items removed: yes. Door open/sign placed on door: yes. Family/friend    ag  

      present: no.                                                                                

17:00 Safety checks: Items removed: yes. Door open/sign placed on door: yes. Family/friend    ag  

      present: yes.                                                                               

17:16 Safety checks: Items removed: yes. Door open/sign placed on door: yes. Family/friend    ag  

      present: no.                                                                                

17:30 Safety checks: Items removed: yes. Door open/sign placed on door: yes. Family/friend    ag  

      present: no.                                                                                

17:45 Safety checks: Items removed: yes. Door open/sign placed on door: yes. Family/friend    ag  

      present: no.                                                                                

18:00 Safety checks: Items removed: yes. Door open/sign placed on door: yes. Family/friend    ag  

      present: no.                                                                                

18:15 Safety checks: Items removed: yes. Door open/sign placed on door: yes. Family/friend    ag  

      present: no.                                                                                

18:33 Safety checks: Items removed: yes. Door open/sign placed on door: yes. Family/friend    ag  

      present: no.                                                                                

18:45 Safety checks: Items removed: yes. Door open/sign placed on door: yes. Family/friend    mw2 

      present: no.                                                                                

19:00 Patient has correct armband on for positive identification.                             kk5 

19:00 Safety checks: Items removed: yes. Door open/sign placed on door: yes. Family/friend    mw2 

      present: no.                                                                                

19:00 Report given to SELWYN Jcakson.                                                             aa5 

19:15 Safety checks: Items removed: yes. Door open/sign placed on door: yes. Family/friend    mw2 

      present: no.                                                                                

19:30 Safety checks: Items removed: yes. Door open/sign placed on door: yes. Family/friend    mw2 

      present: no.                                                                                

19:45 Safety checks: Items removed: yes. Door open/sign placed on door: yes. Family/friend    mw2 

      present: no.                                                                                

20:00 Safety checks: Items removed: yes. Door open/sign placed on door: yes. Family/friend    mw2 

      present: no.                                                                                

20:15 Safety checks: Items removed: yes. Door open/sign placed on door: yes. Family/friend    mw2 

      present: no.                                                                                

20:30 Safety checks: Items removed: yes. Door open/sign placed on door: yes. Family/friend    mw2 

      present: no.                                                                                

20:45 Safety checks: Items removed: yes. Door open/sign placed on door: yes. Family/friend    mw2 

      present: no.                                                                                

21:00 Safety checks: Items removed: yes. Door open/sign placed on door: yes. Family/friend    mw2 

      present: no.                                                                                

21:15 Safety checks: Items removed: yes. Door open/sign placed on door: yes. Family/friend    mw2 

      present: no.                                                                                

21:30 Safety checks: Items removed: yes. Door open/sign placed on door: yes. Family/friend    mw2 

      present: no.                                                                                

21:45 Safety checks: Items removed: yes. Door open/sign placed on door: yes. Family/friend    mw2 

      present: no.                                                                                

22:00 Safety checks: Items removed: yes. Door open/sign placed on door: yes. Family/friend    mw2 

      present: no.                                                                                

22:11 Primary Nurse role handed off by Meghan Reyna RN                                    rg2 

22:15 Safety checks: Items removed: yes. Door open/sign placed on door: yes. Family/friend    mw2 

      present: no.                                                                                

22:30 Safety checks: Items removed: yes. Door open/sign placed on door: yes. Family/friend    mw2 

      present: no.                                                                                

22:45 Safety checks: Items removed: yes. Door open/sign placed on door: yes. Family/friend    mw2 

      present: no.                                                                                

23:00 Safety checks: Items removed: yes. Door open/sign placed on door: yes. Family/friend    mw2 

      present: no. Safety checks: Items removed: yes. Door open/sign placed on door: yes.         

      Family/friend present: no.                                                                  

23:17 Sitter at bedside.                                                                      kk5 

                                                                                             

00:31 No apparent distress. Appears to be sleeping. Safety Checks: Personal items have been   kk5 

      removed. The door is open or patient has been placed in a hallway bed/chair. There are      

      no family/friend visitors at this time.                                                     

00:59 Appears to be sleeping. Safety Checks: Personal items have been removed. The door is    kk5 

      open or patient has been placed in a hallway bed/chair.                                     

01:18 Resting quietly. Safety Checks: The door is open or patient has been placed in a        kk5 

      hallway bed/chair.                                                                          

01:47 Resting quietly. Safety Checks: The door is open or patient has been placed in a        kk5 

      hallway bed/chair.                                                                          

04:56 Resting quietly. Safety Checks: Sitter present at this time.                            kk5 

06:46 Safety Checks: Personal items have been removed. The door is open or patient has been   kk5 

      placed in a hallway bed/chair. Sitter present at this time.                                 

07:09 Safety checks: Items removed: yes. Door open/sign placed on door: yes. Family/friend    em1 

      present: no. Sitter at bedside.                                                             

07:10 Attending Physician role handed off by Nahum Erwin MD                              kdr 

07:10 Catracho Bustos MD is Attending Physician.                                              kdr 

07:15 Safety checks:. Spoke with Daphne at [facility] and they have no beds. Sylvia at [facility] no ag  

      beds he is on a waiting list.                                                               

07:17 Balbina with [facility] has no beds. [facility] has no beds still on waiting list. ag  

07:24 No beds at [facility].                                                         ag  

07:28 Aisha with [facility] clinical's are still under review.                       ag  

07:31 Karly with [facility] beds are still at capacity.                                        ag  

07:31 [place] [facility] [place]                                                  ag  

07:31 Usha with [facility] still waiting on beds.                                          ag  

07:46 Angelique no beds available at [facility]. Sofiya with [facility] no under review ag  

      no beds.                                                                                    

07:46 Maureen with [facility] will call back with information.                               ag  

09:00 Safety Checks: The door is open or patient has been placed in a hallway bed/chair.      aa5 

      There are no family/friend visitors at this time Sitter present at this time.               

09:15 Safety Checks: The door is open or patient has been placed in a hallway bed/chair.      aa5 

      There are no family/friend visitors at this time Sitter present at this time.               

09:30 Safety Checks: The door is open or patient has been placed in a hallway bed/chair.      aa5 

      There are no family/friend visitors at this time Sitter present at this time.               

09:32 Meghan Reyna RN is Primary Nurse.                                                   aa 

09:45 Safety Checks: The door is open or patient has been placed in a hallway bed/chair.      aa5 

      There are no family/friend visitors at this time Sitter present at this time.               

10:00 Safety Checks: The door is open or patient has been placed in a hallway bed/chair.      aa5 

      There are no family/friend visitors at this time Sitter present at this time.               

10:15 Safety Checks: The door is open or patient has been placed in a hallway bed/chair. A    aa5 

      family member and/or friend is present and encouraged to stay. Sitter present at this       

      time.                                                                                       

10:30 Safety Checks: The door is open or patient has been placed in a hallway bed/chair. A    aa5 

      family member and/or friend is present and encouraged to stay. Sitter present at this       

      time.                                                                                       

10:30 Safety checks: Items removed: yes. Door open/sign placed on door: yes. Family/friend    dh3 

      present: no.                                                                                

10:45 Safety Checks: The door is open or patient has been placed in a hallway bed/chair. A    aa5 

      family member and/or friend is present and encouraged to stay. Sitter present at this       

      time.                                                                                       

10:45 Safety checks: Items removed: yes. Door open/sign placed on door: yes. Family/friend    dh3 

      present: no.                                                                                

11:00 Safety Checks: The door is open or patient has been placed in a hallway bed/chair.      aa5 

      There are no family/friend visitors at this time Sitter present at this time.               

11:00 Safety checks: Items removed: yes. Door open/sign placed on door: yes. Family/friend    dh3 

      present: no.                                                                                

11:15 Safety Checks: The door is open or patient has been placed in a hallway bed/chair.      aa5 

      There are no family/friend visitors at this time Sitter present at this time.               

11:15 Safety checks: Items removed: yes. Door open/sign placed on door: yes. Family/friend    dh3 

      present: yes. Family/friends encouraged to stay with patient.                               

11:30 Safety checks: Items removed: yes. Door open/sign placed on door: yes. Family/friend    dh3 

      present: yes. Family/friends encouraged to stay with patient.                               

11:45 Safety checks: Items removed: yes. Door open/sign placed on door: yes. Family/friend    dh3 

      present: yes. Family/friends encouraged to stay with patient.                               

12:00 Safety checks: Items removed: yes. Door open/sign placed on door: yes. Family/friend    dh3 

      present: yes. Family/friends encouraged to stay with patient.                               

12:15 Safety checks: Door open/sign placed on door: yes. Family/friend present: yes.          dh3 

      Family/friends encouraged to stay with patient.                                             

12:30 Safety checks: Items removed: yes. Door open/sign placed on door: yes. Family/friend    dh3 

      present: yes. Family/friends encouraged to stay with patient.                               

12:45 Safety checks: Items removed: yes. Door open/sign placed on door: yes. Family/friend    dh3 

      present: yes. Family/friends encouraged to stay with patient.                               

13:00 Safety checks: Items removed: yes. Door open/sign placed on door: yes. Family/friend    dh3 

      present: yes. Family/friends encouraged to stay with patient.                               

13:15 Safety checks: Items removed: yes. Door open/sign placed on door: yes. Family/friend    dh3 

      present: yes. Family/friends encouraged to stay with patient.                               

13:30 Safety checks: Items removed: yes. Door open/sign placed on door: yes. Family/friend    dh3 

      present: yes. Family/friends encouraged to stay with patient.                               

13:45 Safety checks: Items removed: yes. Door open/sign placed on door: yes. Family/friend    wj1 

      present: no.                                                                                

14:00 Safety checks: Items removed: yes. Door open/sign placed on door: yes. Family/friend    wj1 

      present: no.                                                                                

14:15 Safety checks: Items removed: yes. Door open/sign placed on door: yes. Family/friend    wj1 

      present: no.                                                                                

14:30 Safety checks: Items removed: yes. Door open/sign placed on door: yes. Family/friend    wj1 

      present: no.                                                                                

14:45 Safety checks: Items removed: yes. Door open/sign placed on door: yes. Family/friend    wj1 

      present: no.                                                                                

15:00 Safety checks: Items removed: yes. Door open/sign placed on door: yes. Family/friend    wj1 

      present: no.                                                                                

15:15 Safety checks: Items removed: yes. Door open/sign placed on door: yes. Family/friend    wj1 

      present: no.                                                                                

15:30 Safety checks: Items removed: yes. Door open/sign placed on door: yes. Family/friend    wj1 

      present: no. Safety checks: Items removed: yes. Door open/sign placed on door: yes.         

      Family/friend present: no.                                                                  

15:45 Safety checks: Items removed: yes. Door open/sign placed on door: yes. Family/friend    wj1 

      present: no. Safety checks: Items removed: yes. Door open/sign placed on door: yes.         

16:00 Safety checks: Items removed: yes. Door open/sign placed on door: yes. Family/friend    wj1 

      present: no.                                                                                

16:15 Safety checks: Items removed: yes. Door open/sign placed on door: yes. Family/friend    wj1 

      present: no.                                                                                

16:30 Safety checks: Items removed: yes. Door open/sign placed on door: yes. Family/friend    wj1 

      present: yes. Family/friends encouraged to stay with patient.                               

16:45 Safety checks: Items removed: yes. Door open/sign placed on door: yes. Family/friend    wj1 

      present: yes. Family/friends encouraged to stay with patient.                               

17:00 Safety checks: Items removed: yes. Door open/sign placed on door: yes. Family/friend    wj1 

      present: yes. Family/friends encouraged to stay with patient.                               

17:15 Safety checks: Items removed: yes. Door open/sign placed on door: yes. Family/friend    wj1 

      present: yes. Family/friends encouraged to stay with patient.                               

17:30 Safety checks: Items removed: yes. Door open/sign placed on door: yes. Family/friend    wj1 

      present: yes. Family/friends encouraged to stay with patient.                               

17:45 Safety checks: Items removed: yes. Door open/sign placed on door: yes. Family/friend    wj1 

      present: yes.                                                                               

18:00 Safety checks: Items removed: yes. Door open/sign placed on door: yes. Family/friend    wj1 

      present: yes. Family/friends encouraged to stay with patient.                               

18:15 Safety checks: Items removed: yes. Door open/sign placed on door: yes. Family/friend    wj1 

      present: yes.                                                                               

18:30 Safety checks: Items removed: yes. Door open/sign placed on door: yes. Family/friend    wj1 

      present: yes.                                                                               

18:45 Safety checks: Items removed: yes. Door open/sign placed on door: yes. Family/friend    wj1 

      present: yes. Family/friends encouraged to stay with patient.                               

19:00 Safety Checks: Personal items have been removed. The door is open or patient has been   jd3 

      placed in a hallway bed/chair. A family member and/or friend is present and encouraged      

      to stay. Sitter present at this time.                                                       

19:05 Primary Nurse role handed off by Meghan Reyna RN                                    jd3 

19:05 Waqar Vides RN is Primary Nurse.                                                  jd3 

19:15 Safety Checks: Personal items have been removed. The door is open or patient has been   jd3 

      placed in a hallway bed/chair. A family member and/or friend is present and encouraged      

      to stay. Sitter present at this time.                                                       

19:30 Safety Checks: Personal items have been removed. The door is open or patient has been   jd3 

      placed in a hallway bed/chair. A family member and/or friend is present and encouraged      

      to stay. Sitter present at this time.                                                       

19:45 Safety Checks: Personal items have been removed. The door is open or patient has been   jd3 

      placed in a hallway bed/chair. A family member and/or friend is present and encouraged      

      to stay. Sitter present at this time.                                                       

20:00 Safety Checks: Personal items have been removed. The door is open or patient has been   jd3 

      placed in a hallway bed/chair. A family member and/or friend is present and encouraged      

      to stay. Sitter present at this time.                                                       

20:15 Safety Checks: Personal items have been removed. The door is open or patient has been   jd3 

      placed in a hallway bed/chair. There are no family/friend visitors at this time Sitter      

      present at this time.                                                                       

20:30 Safety Checks: Personal items have been removed. The door is open or patient has been   jd3 

      placed in a hallway bed/chair. There are no family/friend visitors at this time Sitter      

      present at this time.                                                                       

20:45 Safety Checks: Personal items have been removed. The door is open or patient has been   jd3 

      placed in a hallway bed/chair. There are no family/friend visitors at this time Sitter      

      present at this time.                                                                       

21:00 Safety Checks: Personal items have been removed. The door is open or patient has been   jd3 

      placed in a hallway bed/chair. There are no family/friend visitors at this time Sitter      

      present at this time.                                                                       

21:15 Safety Checks: Personal items have been removed. The door is open or patient has been   jd3 

      placed in a hallway bed/chair. There are no family/friend visitors at this time Sitter      

      present at this time.                                                                       

21:30 Safety Checks: Personal items have been removed. The door is open or patient has been   jd3 

      placed in a hallway bed/chair. There are no family/friend visitors at this time Sitter      

      present at this time.                                                                       

21:45 Safety Checks: Personal items have been removed. The door is open or patient has been   jd3 

      placed in a hallway bed/chair. There are no family/friend visitors at this time Sitter      

      present at this time.                                                                       

22:00 Safety Checks: Personal items have been removed. The door is open or patient has been   jd3 

      placed in a hallway bed/chair. There are no family/friend visitors at this time Sitter      

      present at this time.                                                                       

22:15 Safety Checks: Personal items have been removed. The door is open or patient has been   jd3 

      placed in a hallway bed/chair. There are no family/friend visitors at this time Sitter      

      present at this time.                                                                       

22:30 Safety Checks: Personal items have been removed. The door is open or patient has been   jd3 

      placed in a hallway bed/chair. There are no family/friend visitors at this time Sitter      

      present at this time.                                                                       

22:45 Safety Checks: Personal items have been removed. The door is open or patient has been   jd3 

      placed in a hallway bed/chair. There are no family/friend visitors at this time Sitter      

      present at this time.                                                                       

23:00 Safety Checks: Personal items have been removed. The door is open or patient has been   jd3 

      placed in a hallway bed/chair. There are no family/friend visitors at this time Sitter      

      present at this time.                                                                       

23:15 Safety Checks: Sitter present at this time. safety checks being done by sitter at this  jd3 

      time.                                                                                       

23:15 Safety checks: Items removed: yes. Door open/sign placed on door: yes. Family/friend    td  

      present: no.                                                                                

23:30 Safety checks: Items removed: yes. Door open/sign placed on door: yes. Family/friend    td  

      present: no.                                                                                

23:45 Safety checks: Items removed: yes. Door open/sign placed on door: yes. Family/friend    td  

      present: no.                                                                                

                                                                                             

00:00 Safety checks: Items removed: yes. Door open/sign placed on door: yes. Family/friend    td  

      present: no.                                                                                

00:15 Safety checks: Items removed: yes. Door open/sign placed on door: yes. Family/friend    td  

      present: no.                                                                                

00:30 Safety checks: Items removed: yes. Door open/sign placed on door: yes. Family/friend    td  

      present: no.                                                                                

00:45 Safety checks: Items removed: yes. Door open/sign placed on door: yes. Family/friend    td  

      present: no.                                                                                

01:00 Safety checks: Items removed: yes. Door open/sign placed on door: yes. Family/friend    td  

      present: no.                                                                                

01:15 Safety checks: Items removed: yes. Door open/sign placed on door: yes. Family/friend    td  

      present: no.                                                                                

01:30 Safety checks: Items removed: yes. Door open/sign placed on door: yes. Family/friend    td  

      present: no.                                                                                

01:45 Safety checks: Items removed: yes. Door open/sign placed on door: yes. Family/friend    td  

      present: no.                                                                                

02:00 Safety checks: Items removed: yes. Door open/sign placed on door: yes. Family/friend    td  

      present: no.                                                                                

02:15 Safety checks: Items removed: yes. Door open/sign placed on door: yes. Family/friend    td  

      present: no.                                                                                

02:30 Safety checks: Items removed: yes. Door open/sign placed on door: yes. Family/friend    td  

      present: no.                                                                                

03:00 Safety checks: Items removed: yes. Door open/sign placed on door: yes. Family/friend    td  

      present: no.                                                                                

03:15 Safety checks: Items removed: yes. Door open/sign placed on door: yes. Family/friend    td  

      present: no.                                                                                

03:30 Safety checks: Items removed: yes. Door open/sign placed on door: yes. Family/friend    td  

      present: no.                                                                                

03:45 Safety checks: Items removed: yes. Door open/sign placed on door: yes. Family/friend    td  

      present: no.                                                                                

04:00 Safety checks: Items removed: yes. Door open/sign placed on door: yes. Family/friend    td  

      present: no.                                                                                

04:15 Safety checks: Items removed: yes. Door open/sign placed on door: yes. Family/friend    td  

      present: no.                                                                                

04:30 Safety checks: Items removed: yes. Door open/sign placed on door: yes. Family/friend    td  

      present: no.                                                                                

04:45 Safety checks: Items removed: yes. Door open/sign placed on door: yes. Family/friend    td  

      present: no.                                                                                

05:00 Safety checks: Items removed: yes. Door open/sign placed on door: yes. Family/friend    td  

      present: no.                                                                                

05:15 Safety checks: Items removed: yes. Door open/sign placed on door: yes. Family/friend    td  

      present: no.                                                                                

05:30 Safety checks: Items removed: yes. Door open/sign placed on door: yes. Family/friend    td  

      present: no.                                                                                

05:45 Safety checks: Items removed: yes. Door open/sign placed on door: yes. Family/friend    td  

      present: no.                                                                                

06:00 Safety checks: Items removed: yes. Door open/sign placed on door: yes. Family/friend    td  

      present: no.                                                                                

06:15 Safety checks: Items removed: yes. Door open/sign placed on door: yes. Family/friend    td  

      present: no.                                                                                

06:30 Safety checks: Items removed: yes. Door open/sign placed on door: yes. Family/friend    td  

      present: no.                                                                                

06:45 Safety checks: Items removed: yes. Door open/sign placed on door: yes. Family/friend    td  

      present: no.                                                                                

07:00 Safety checks: Items removed: yes. Door open/sign placed on door: yes. Family/friend    wj1 

      present: no.                                                                                

07:15 Safety checks: Items removed: yes. Door open/sign placed on door: yes. Family/friend    wj1 

      present: no.                                                                                

07:30 Safety checks: Items removed: yes. Door open/sign placed on door: yes. Family/friend    wj1 

      present: no.                                                                                

07:45 Safety checks: Items removed: yes. Door open/sign placed on door: yes. Family/friend    wj1 

      present: no.                                                                                

08:00 Safety checks: Items removed: yes. Door open/sign placed on door: yes. Family/friend    wj1 

      present: no.                                                                                

08:15 Safety checks: Items removed: yes. Door open/sign placed on door: yes.                  wj1 

08:30 Safety checks: Items removed: yes. Door open/sign placed on door: yes. Family/friend    wj1 

      present: no.                                                                                

08:45 Safety checks: Items removed: yes. Door open/sign placed on door: yes. Family/friend    wj1 

      present: yes. Family/friends encouraged to stay with patient.                               

09:00 Safety checks: Items removed: yes. Door open/sign placed on door: yes. Family/friend    wj1 

      present: yes. Family/friends encouraged to stay with patient.                               

09:15 Safety checks: Items removed: yes. Door open/sign placed on door: yes. Family/friend    wj1 

      present: Other: patient is being transferred.                                               

                                                                                                  

Administered Medications:                                                                         

                                                                                             

23:43 Drug: NS 0.9% 1000 ml Route: IV; Rate: 1000 ml; Site: right forearm;                    lp1 

                                                                                             

07:08 Follow up: IV Status: Completed infusion                                                ak1 

                                                                                             

23:43 Drug: Ativan 1 mg Route: IVP; Site: right forearm;                                      lp1 

                                                                                             

01:02 Follow up: Response: No adverse reaction; Marked relief of symptoms                     lp1 

01:08 Drug: Potassium Chloride 40 mEq Route: PO;                                              lp1 

03:56 Follow up: Response: No adverse reaction                                                ak1 

03:56 Not Given (Patient Refused; pt became rude, threatend nurse if she gave medication):    ak1 

      Ativan 1 mg IVP once                                                                        

06:47 Drug: Ativan 2 mg Route: IM; Site: right deltoid;                                       bb  

07:20 Follow up: Response: No adverse reaction; Marked relief of symptoms                     aa5 

08:55 Drug: Potassium Effervescent Tablet 50 mEq Route: PO;                                   aa5 

11:00 Follow up: Response: No adverse reaction                                                aa5 

08:55 Drug: Potassium Chloride 20 mEq Route: IV; Rate: per protocol; Site: right antecubital; aa5 

11:00 Follow up: Response: No adverse reaction; IV Status: Completed infusion                 aa5 

08:55 Drug: NS 0.9% with KCl 20 mEq/L 1000 ml Route: IV; Rate: 125 ml/hr; Site: right         aa5 

      antecubital;                                                                                

11:00 Follow up: IV Status: Infusion continued                                                aa5 

16:42 Follow up: IV Status: Completed infusion; Order to discontinue infusion                 aa5 

13:24 Drug: NS 0.9% 1000 ml Route: IV; Rate: 1 bolus; Site: right antecubital;                aa5 

14:10 Follow up: IV Status: Completed infusion                                                aa5 

                                                                                                  

                                                                                                  

Outcome:                                                                                          

06:18 Patient left the ED.                                                                    ak1 

06:56 ER care complete, transfer ordered by MD.                                               rn  

                                                                                             

09:25 Transferred by ground EMS to other acute care facility, Transfer form completed.        em  

      Condition: good                                                                             

      Instructed on the need for transfer, Demonstrated understanding of instructions.            

09:41 Patient left the ED.                                                                    em  

                                                                                                  

Signatures:                                                                                       

Deanna Casey                               rg2                                                  

Nahum Erwin MD MD cha Rittger, Kevin, MD MD kdr Munoz, Jozef, LVN                       LVN  em                                                   

Savannah Quiñones RN RN bb Williams, Irene, Jesús Orellana RN, MD MD rn Martinez, Eric                               em1                                                  

Meghan Reyna RN                     SELWYN   aa5                                                  

Millie Romero, RN                         RN   lp1                                                  

Marylou Singh, RN                      RN   tl1                                                  

Eva Bedolla Amber, RN RN   ak1                                                  

Rina Huffman, Karen Pinon RN, ph, SELWYN SAMANO                                                      

Renetta Dorman RN                        RN   tl2                                                  

Melani Burks                              3                                                  

Waqar Vides RN                    RN   jd3                                                  

Tucker Melara                            2                                                  

Jaja Duncan                              5                                                  

Mayte Whatley                             wWayne Vergaraika                                td                                                   

                                                                                                  

Corrections: (The following items were deleted from the chart)                                    

                                                                                             

04:58 04:15 Safety Checks: Personal items have been removed. The door is open or patient has  ak1 

      been placed in a hallway bed/chair. There are no family/friend visitors at this time        

      Sitter present at this time. ak1                                                            

05:24 04:45 Safety Checks: Personal items have been removed. The door is open or patient has  ak1 

      been placed in a hallway bed/chair. There are no family/friend visitors at this time        

      Sitter present at this time. ak1                                                            

06:17 06:02 Reassessment: pt removed IV, refused to stay, left out ER back doors in hospital  ak1 

      gissell and dianna. pt was told again prior to his leaving he had a intermediate warrant and      

      that [place] [employer] would be on the way. [place] [employer] dispatch contacted.. ak1          

10: 07:00 Safety checks: Items removed: yes. Door open/sign placed on door: yes.            ag  

      Family/friend present: no. ag                                                               

10: 07:15 Safety checks: Items removed: yes. Door open/sign placed on door: yes.            ag  

      Family/friend present: no. ag                                                               

16:28 15:00 Safety checks: Items removed: yes. Door open/sign placed on door: yes.            ag  

      Family/friend present: yes. ag                                                              

16:29 15:30 Safety checks: Items removed: yes. Door open/sign placed on door: yes. ag         ag  

                                                                                             

13:15  10:33 Safety checks: Items removed: yes. Door open/sign placed on door: yes.      dh3 

      Family/friend present: no. ag                                                               

                                                                                             

13:23  13:01 Safety checks: Items removed: yes. Door open/sign placed on door: yes.      dh3 

      Family/friend present: no. ag                                                               

                                                                                             

13:23 11:32 Safety checks: Items removed: yes. Door open/sign placed on door: yes.            dh3 

      Family/friend present: yes. wj1                                                             

13:23 11:51 Safety checks: Items removed: yes. Door open/sign placed on door: yes.            dh3 

      Family/friend present: yes. wj1                                                             

13:23 12:19 Safety checks: Items removed: yes. wj1                                            3 

13:23 12:19 Safety checks: Door open/sign placed on door: yes. Family/friend present: yes. [email or web address] 

13:25 13:00 Safety checks: Items removed: yes. Door open/sign placed on door: yes.            dh3 

      Family/friend present: no. dh3                                                              

13: 11:30 Safety checks: Items removed: yes. Door open/sign placed on door: yes.            dh3 

      Family/friend present: yes. 3                                                             

13: 11:45 Safety checks: Items removed: yes. Door open/sign placed on door: yes.            dh3 

      Family/friend present: yes. 3                                                             

13: 12:15 Safety checks: Door open/sign placed on door: yes. Family/friend present: yes. [email or web address] 

18:05 17:30 Reassessment: pt has had no other outbursts or attempts to leave since 0640 on      

      18. Pt has been cooperative and requesting to be sent to psych facility for            

      suicidal ideation, pt states that if he is left alone he will try to do something to        

      harm himself because he starts thinking about his son who was killed. Pt has never been     

      restrained by ER staff, pt was only in handcuffs when police escorted pt back to the ER     

      on 18, once pt was uncuffed at 0720 on 18 he has been cooperative and willing     

      to be transferred to psych facility, pt is apologetic about his behavior and states         

      that he is normally very cooperative with authority figures. iw                             

20:09 20:00 BP 99 / 59; Pulse 69bpm; Resp 19bpm; Spontaneous; Pulse Ox 97% RA; Temp 98.0F     jd3 

      Oral; Pain 0/10; jd3                                                                        

                                                                                             

00:07  23:56  / 69; Pulse 65bpm; Resp 18bpm; Pulse Ox 98%; Temp 97.9F; Pain 0/10;  td  

      td                                                                                          

                                                                                             

03:18 03:16 Safety checks: Items removed: yes. Door open/sign placed on door: yes.            td  

      Family/friend present: no. td                                                               

08:42 08:14 Reassessment: report given to SELWYN Zule[place] [facility] [place] [facility] [place]  

      administrative approval em                                                                  

                                                                                                  

**************************************************************************************************

## 2018-06-27 NOTE — EKG
Test Date:    2018-06-26               Test Time:    23:38:44

Technician:   DERRELL                                    

                                                     

MEASUREMENT RESULTS:                                       

Intervals:                                           

Rate:         102                                    

AZ:           134                                    

QRSD:         78                                     

QT:           356                                    

QTc:          463                                    

Axis:                                                

P:            61                                     

AZ:           134                                    

QRS:          74                                     

T:            46                                     

                                                     

INTERPRETIVE STATEMENTS:                                       

                                                     

Sinus tachycardia

Otherwise normal ECG

Compared to ECG 04/25/2018 17:34:10

Sinus rhythm no longer present



Electronically Signed On 06-27-18 09:24:22 CDT by Marciano George
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
